# Patient Record
Sex: FEMALE | Race: WHITE | NOT HISPANIC OR LATINO | Employment: PART TIME | URBAN - METROPOLITAN AREA
[De-identification: names, ages, dates, MRNs, and addresses within clinical notes are randomized per-mention and may not be internally consistent; named-entity substitution may affect disease eponyms.]

---

## 2019-03-11 ENCOUNTER — OFFICE VISIT (OUTPATIENT)
Dept: URGENT CARE | Facility: CLINIC | Age: 24
End: 2019-03-11
Payer: COMMERCIAL

## 2019-03-11 VITALS
DIASTOLIC BLOOD PRESSURE: 63 MMHG | RESPIRATION RATE: 20 BRPM | HEIGHT: 68 IN | OXYGEN SATURATION: 99 % | WEIGHT: 214 LBS | BODY MASS INDEX: 32.43 KG/M2 | SYSTOLIC BLOOD PRESSURE: 118 MMHG | HEART RATE: 76 BPM | TEMPERATURE: 98.3 F

## 2019-03-11 DIAGNOSIS — J02.9 ACUTE VIRAL PHARYNGITIS: Primary | ICD-10-CM

## 2019-03-11 LAB — S PYO AG THROAT QL: NEGATIVE

## 2019-03-11 PROCEDURE — 87430 STREP A AG IA: CPT | Performed by: PREVENTIVE MEDICINE

## 2019-03-11 PROCEDURE — 99283 EMERGENCY DEPT VISIT LOW MDM: CPT | Performed by: PREVENTIVE MEDICINE

## 2019-03-11 PROCEDURE — G0382 LEV 3 HOSP TYPE B ED VISIT: HCPCS | Performed by: PREVENTIVE MEDICINE

## 2019-03-11 NOTE — PATIENT INSTRUCTIONS
Hot salt water gargles may be helpful  Cepacol lozenges for pain  Motrin or Tylenol for fever, chills or aches  Follow up with PCP in 3-5 days if no improvement in symptoms done

## 2019-03-11 NOTE — PROGRESS NOTES
[facility]        NAME: Azeb Rodgers is a 26 y o  female  : 1995    MRN: [id number]  DATE: 2019  TIME: 3:25 PM    Assessment and Plan   Acute viral pharyngitis [J02 8, B97 89]  1  Acute viral pharyngitis           Patient Instructions       Follow up with PCP in 3-5 days  Proceed to  ER if symptoms worsen  Chief Complaint     Chief Complaint   Patient presents with    Sore Throat     patient reports she has had a productive cough along with a sore throat x 1 5 weeks  deneis fever or chills  taking Ibuprofen alternating with Tylenol, last dose this am             History of Present Illness       Sore throat and cough  for 1 and half weeks  Denies fever  Denies rash  Review of Systems   Review of Systems   HENT: Positive for congestion and sore throat  Respiratory: Positive for cough  Current Medications     No current outpatient medications on file  Current Allergies     Allergies as of 2019    (No Known Allergies)            The following portions of the patient's history were reviewed and updated as appropriate: allergies, current medications, past family history, past medical history, past social history, past surgical history and problem list      History reviewed  No pertinent past medical history  History reviewed  No pertinent surgical history  No family history on file  Medications have been verified  Objective   /63   Pulse 76   Temp 98 3 °F (36 8 °C)   Resp 20   Ht 5' 8" (1 727 m)   Wt 97 1 kg (214 lb)   LMP 2019   SpO2 99%   BMI 32 54 kg/m²        Physical Exam     Physical Exam   HENT:   Mouth/Throat: Posterior oropharyngeal edema present  TMs blocked by wax   Cardiovascular: Normal heart sounds  Pulmonary/Chest: Breath sounds normal    Lymphadenopathy:     She has no cervical adenopathy      Rapid strep screen negative at 5 minutes

## 2019-03-13 LAB — B-HEM STREP SPEC QL CULT: NEGATIVE

## 2019-03-29 ENCOUNTER — OFFICE VISIT (OUTPATIENT)
Dept: URGENT CARE | Facility: CLINIC | Age: 24
End: 2019-03-29
Payer: COMMERCIAL

## 2019-03-29 VITALS
HEART RATE: 92 BPM | DIASTOLIC BLOOD PRESSURE: 81 MMHG | TEMPERATURE: 99.7 F | RESPIRATION RATE: 16 BRPM | OXYGEN SATURATION: 98 % | WEIGHT: 210 LBS | HEIGHT: 68 IN | BODY MASS INDEX: 31.83 KG/M2 | SYSTOLIC BLOOD PRESSURE: 129 MMHG

## 2019-03-29 DIAGNOSIS — J01.00 ACUTE NON-RECURRENT MAXILLARY SINUSITIS: Primary | ICD-10-CM

## 2019-03-29 PROCEDURE — 99283 EMERGENCY DEPT VISIT LOW MDM: CPT | Performed by: PHYSICIAN ASSISTANT

## 2019-03-29 PROCEDURE — G0382 LEV 3 HOSP TYPE B ED VISIT: HCPCS | Performed by: PHYSICIAN ASSISTANT

## 2019-03-29 RX ORDER — AMOXICILLIN AND CLAVULANATE POTASSIUM 875; 125 MG/1; MG/1
1 TABLET, FILM COATED ORAL EVERY 12 HOURS SCHEDULED
Qty: 20 TABLET | Refills: 0 | Status: SHIPPED | OUTPATIENT
Start: 2019-03-29 | End: 2019-04-08

## 2019-03-29 NOTE — PROGRESS NOTES
NAME: Kimberly Lovelace is a 26 y o  female  : 1995    MRN: [id number]      Assessment and Plan   Acute non-recurrent maxillary sinusitis [J01 00]  1  Acute non-recurrent maxillary sinusitis  amoxicillin-clavulanate (AUGMENTIN) 875-125 mg per tablet       Mark Pollard was seen today for cold like symptoms  Diagnoses and all orders for this visit:    Acute non-recurrent maxillary sinusitis  -     amoxicillin-clavulanate (AUGMENTIN) 875-125 mg per tablet; Take 1 tablet by mouth every 12 (twelve) hours for 10 days        Patient Instructions   Patient Instructions   Exam findings are consistent with bacterial sinusitis  Take Augmentin as noted  Advise Pt to use OTC Flonase for nasal congestion  Advise Pt to use of OTC Tylenol alternating with OTC Ibuprofen for pain or fever  If symptoms persist the next 2-3 days Pt should follow-up with PCP  If >102 fever, N/V, SOB, CP go to ER  Patient understands and agrees with treatment plans  Proceed to ER if symptoms worsen  Chief Complaint     Chief Complaint   Patient presents with    Cold Like Symptoms     sinus congestion for approx 1 week  Pt reports she was seen in our office and since has had onset or new/worsening symptoms  History of Present Illness     26year old presents c/o cough x 1 wk  Pt reports she was 3/11   Pt reports symptoms improvement  Pt admits productive cough, sinus pressure and pain, sinus H/A, ear pressure, rhinorrhea, nasal congestion, PND  Pt denies  fever, chills, fatigue, n/v/d/c, ,  sore throat,  ear pain, SOB, chest pain, difficulty breathing  Has taken OTC  Mucinex with no relief  Review of Systems   Review of Systems   Constitutional: Negative for chills, fatigue and fever  HENT: Positive for ear pain, postnasal drip, rhinorrhea and sinus pressure  Negative for congestion and sore throat  Respiratory: Positive for cough  Negative for chest tightness, shortness of breath and wheezing      Cardiovascular: Negative for chest pain and palpitations  Gastrointestinal: Negative for abdominal pain, constipation, diarrhea, nausea and vomiting  Current Medications       Current Outpatient Medications:     amoxicillin-clavulanate (AUGMENTIN) 875-125 mg per tablet, Take 1 tablet by mouth every 12 (twelve) hours for 10 days, Disp: 20 tablet, Rfl: 0    Current Allergies     Allergies as of 03/29/2019    (No Known Allergies)              No past medical history on file  No past surgical history on file  Family History   Problem Relation Age of Onset    No Known Problems Mother     No Known Problems Father          Medications have been verified  The following portions of the patient's history were reviewed and updated as appropriate: allergies, current medications, past family history, past medical history, past social history, past surgical history and problem list     Objective   /81   Pulse 92   Temp 99 7 °F (37 6 °C)   Resp 16   Ht 5' 8" (1 727 m)   Wt 95 3 kg (210 lb)   LMP  (Within Weeks)   SpO2 98%   BMI 31 93 kg/m²      Physical Exam     Physical Exam   Constitutional: She appears well-developed and well-nourished  No distress  HENT:   Head: Normocephalic and atraumatic  Right Ear: Hearing, tympanic membrane, external ear and ear canal normal    Left Ear: Hearing, tympanic membrane, external ear and ear canal normal    Nose: Right sinus exhibits maxillary sinus tenderness  Right sinus exhibits no frontal sinus tenderness  Left sinus exhibits maxillary sinus tenderness  Left sinus exhibits no frontal sinus tenderness  Mouth/Throat: Uvula is midline, oropharynx is clear and moist and mucous membranes are normal  No tonsillar exudate  Cardiovascular: Normal rate, regular rhythm and normal heart sounds  Exam reveals no gallop and no friction rub  No murmur heard  Pulmonary/Chest: Effort normal and breath sounds normal  No stridor  She has no wheezes  She has no rales     Skin: She is not diaphoretic  Nursing note and vitals reviewed        Svitlana Campbell PA-C

## 2019-03-29 NOTE — PATIENT INSTRUCTIONS
Exam findings are consistent with bacterial sinusitis  Take Augmentin as noted  Advise Pt to use OTC Flonase for nasal congestion  Advise Pt to use of OTC Tylenol alternating with OTC Ibuprofen for pain or fever  If symptoms persist the next 2-3 days Pt should follow-up with PCP  If >102 fever, N/V, SOB, CP go to ER  Patient understands and agrees with treatment plans

## 2021-09-10 ENCOUNTER — APPOINTMENT (OUTPATIENT)
Dept: RADIOLOGY | Facility: CLINIC | Age: 26
End: 2021-09-10
Payer: COMMERCIAL

## 2021-09-10 ENCOUNTER — OFFICE VISIT (OUTPATIENT)
Dept: URGENT CARE | Facility: CLINIC | Age: 26
End: 2021-09-10
Payer: COMMERCIAL

## 2021-09-10 VITALS
WEIGHT: 244 LBS | RESPIRATION RATE: 16 BRPM | HEART RATE: 74 BPM | BODY MASS INDEX: 36.98 KG/M2 | HEIGHT: 68 IN | TEMPERATURE: 97.6 F | OXYGEN SATURATION: 100 %

## 2021-09-10 DIAGNOSIS — M25.511 CHRONIC RIGHT SHOULDER PAIN: Primary | ICD-10-CM

## 2021-09-10 DIAGNOSIS — G89.29 CHRONIC RIGHT SHOULDER PAIN: Primary | ICD-10-CM

## 2021-09-10 DIAGNOSIS — M25.531 RIGHT WRIST PAIN: ICD-10-CM

## 2021-09-10 DIAGNOSIS — M25.511 RIGHT SHOULDER PAIN, UNSPECIFIED CHRONICITY: ICD-10-CM

## 2021-09-10 PROCEDURE — G0382 LEV 3 HOSP TYPE B ED VISIT: HCPCS | Performed by: PHYSICIAN ASSISTANT

## 2021-09-10 PROCEDURE — 73030 X-RAY EXAM OF SHOULDER: CPT

## 2021-09-10 PROCEDURE — 99283 EMERGENCY DEPT VISIT LOW MDM: CPT | Performed by: PHYSICIAN ASSISTANT

## 2021-09-10 NOTE — LETTER
September 10, 2021     Patient: Hannah Hung   YOB: 1995   Date of Visit: 9/10/2021       To Whom it May Concern:    Hannah Hung was seen in my clinic on 9/10/2021  She may return to work on 9/11/2021  If you have any questions or concerns, please don't hesitate to call           Sincerely,          Mani Ashley PA-C        CC: Hannah Hung

## 2021-09-10 NOTE — PROGRESS NOTES
[facility]        NAME: Kamlesh Damon is a 27 y o  female  : 1995    MRN: [id number]  DATE: September 10, 2021  TIME: 1:33 PM    Assessment and Plan   Chronic right shoulder pain [M25 511, G89 29]  1  Chronic right shoulder pain  XR shoulder 2+ vw right    Ambulatory referral to Orthopedic Surgery   2  Right wrist pain  Ambulatory referral to Orthopedic Surgery         Patient Instructions      discussed condition with patient  X-ray of the right shoulder is normal   She has chronic right shoulder pain of uncertain etiology but she is right-hand dominant and performs a lot of repetitive activity so maybe tendinitis due to overuse  She also has right wrist pain which I believe may be due to compensating for right shoulder issues thus causing inflammation in the wrist and forearm  She was given a wrist brace and I recommended ice, rest, stretching, over-the-counter NSAIDs, and she was referred to Orthopedics for consult if issues persist   Follow up with PCP in 3-5 days  Proceed to  ER if symptoms worsen  Chief Complaint     Chief Complaint   Patient presents with    Arm Pain     right wrist (a few months ago) and right shoulder pain (began in )  pt denies single injury but 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         History of Present Illness        Patient presents what she reports is chronic intermittent shoulder pain which she has experienced for the past few years  She denies any known injury or trauma  She is right-hand dominant  She performs a lot of repetitive motion activity and lifting  She also reports that more recently she has now developed pain in the right wrist and distal forearm  She is concerned because she was previously treated for Lyme disease with antibiotics and is wondering if this could be a recurrence but denies any recent tick bites or any other significant symptoms at this time  She denies any right upper extremity numbness or paresthesias        Review of Systems   Review of Systems   Constitutional: Negative  Respiratory: Negative  Cardiovascular: Negative  Gastrointestinal: Negative  Genitourinary: Negative  Musculoskeletal:        Chronic right shoulder and right wrist / forearm pain, no known trauma   Neurological: Negative  Current Medications     No current outpatient medications on file  Current Allergies     Allergies as of 09/10/2021    (No Known Allergies)            The following portions of the patient's history were reviewed and updated as appropriate: allergies, current medications, past family history, past medical history, past social history, past surgical history and problem list      History reviewed  No pertinent past medical history  History reviewed  No pertinent surgical history  Family History   Problem Relation Age of Onset    No Known Problems Mother     No Known Problems Father          Medications have been verified  Objective   Pulse 74   Temp 97 6 °F (36 4 °C)   Resp 16   Ht 5' 8" (1 727 m)   Wt 111 kg (244 lb)   SpO2 100%   BMI 37 10 kg/m²   No LMP recorded  Physical Exam     Physical Exam  Vitals reviewed  Constitutional:       General: She is not in acute distress  Appearance: She is well-developed  Musculoskeletal:      Comments: Tenderness to palpation over the right shoulder joint worsened with passive range of motion which is overall intact without significant difficulty  There is no crepitus or sign of instability noted  There is no swelling, ecchymosis, or deformity  Tenderness to palpation right wrist joint but no swelling, bruising, or deformity  Pain is worse with passive range of motion which is overall intact   strength 5/5 upper extremities bilaterally but push/ pull 4/5 right upper extremity compared to 05/05 left upper extremity  Neurological:      Mental Status: She is alert and oriented to person, place, and time        Sensory: No sensory deficit

## 2021-11-10 ENCOUNTER — APPOINTMENT (OUTPATIENT)
Dept: RADIOLOGY | Facility: CLINIC | Age: 26
End: 2021-11-10
Payer: COMMERCIAL

## 2021-11-10 ENCOUNTER — OFFICE VISIT (OUTPATIENT)
Dept: URGENT CARE | Facility: CLINIC | Age: 26
End: 2021-11-10
Payer: COMMERCIAL

## 2021-11-10 VITALS
OXYGEN SATURATION: 100 % | DIASTOLIC BLOOD PRESSURE: 72 MMHG | WEIGHT: 253 LBS | HEART RATE: 75 BPM | SYSTOLIC BLOOD PRESSURE: 118 MMHG | TEMPERATURE: 99.5 F | RESPIRATION RATE: 16 BRPM | BODY MASS INDEX: 38.34 KG/M2 | HEIGHT: 68 IN

## 2021-11-10 DIAGNOSIS — L03.011 PARONYCHIA OF FINGER OF RIGHT HAND: Primary | ICD-10-CM

## 2021-11-10 DIAGNOSIS — M79.644 PAIN OF FINGER OF RIGHT HAND: ICD-10-CM

## 2021-11-10 PROCEDURE — G0382 LEV 3 HOSP TYPE B ED VISIT: HCPCS | Performed by: PHYSICIAN ASSISTANT

## 2021-11-10 PROCEDURE — 99283 EMERGENCY DEPT VISIT LOW MDM: CPT | Performed by: PHYSICIAN ASSISTANT

## 2021-11-10 PROCEDURE — 73140 X-RAY EXAM OF FINGER(S): CPT

## 2021-11-10 RX ORDER — CEPHALEXIN 500 MG/1
500 CAPSULE ORAL EVERY 8 HOURS SCHEDULED
Qty: 21 CAPSULE | Refills: 0 | Status: SHIPPED | OUTPATIENT
Start: 2021-11-10 | End: 2021-11-17

## 2022-02-08 ENCOUNTER — OFFICE VISIT (OUTPATIENT)
Dept: FAMILY MEDICINE CLINIC | Facility: CLINIC | Age: 27
End: 2022-02-08
Payer: COMMERCIAL

## 2022-02-08 VITALS
WEIGHT: 256.8 LBS | OXYGEN SATURATION: 100 % | HEIGHT: 68 IN | SYSTOLIC BLOOD PRESSURE: 106 MMHG | BODY MASS INDEX: 38.92 KG/M2 | DIASTOLIC BLOOD PRESSURE: 78 MMHG | TEMPERATURE: 98.5 F | HEART RATE: 90 BPM | RESPIRATION RATE: 18 BRPM

## 2022-02-08 DIAGNOSIS — G44.221 CHRONIC TENSION-TYPE HEADACHE, INTRACTABLE: Primary | ICD-10-CM

## 2022-02-08 DIAGNOSIS — G43.119 INTRACTABLE MIGRAINE WITH AURA WITHOUT STATUS MIGRAINOSUS: ICD-10-CM

## 2022-02-08 DIAGNOSIS — Z12.4 CERVICAL CANCER SCREENING: ICD-10-CM

## 2022-02-08 DIAGNOSIS — Z13.220 ENCOUNTER FOR SCREENING FOR LIPID DISORDER: ICD-10-CM

## 2022-02-08 DIAGNOSIS — Z13.1 SCREENING FOR DIABETES MELLITUS: ICD-10-CM

## 2022-02-08 PROCEDURE — 99204 OFFICE O/P NEW MOD 45 MIN: CPT | Performed by: FAMILY MEDICINE

## 2022-02-08 PROCEDURE — 3725F SCREEN DEPRESSION PERFORMED: CPT | Performed by: FAMILY MEDICINE

## 2022-02-08 PROCEDURE — 3008F BODY MASS INDEX DOCD: CPT | Performed by: FAMILY MEDICINE

## 2022-02-08 PROCEDURE — 1036F TOBACCO NON-USER: CPT | Performed by: FAMILY MEDICINE

## 2022-02-08 RX ORDER — METHYLPREDNISOLONE 4 MG/1
TABLET ORAL
Qty: 21 TABLET | Refills: 0 | Status: SHIPPED | OUTPATIENT
Start: 2022-02-08

## 2022-02-08 NOTE — ASSESSMENT & PLAN NOTE
BMI Counseling: Body mass index is 39 39 kg/m²  The BMI is above normal  Nutrition recommendations include reducing portion sizes, decreasing overall calorie intake and 3-5 servings of fruits/vegetables daily  Exercise recommendations include vigorous aerobic physical activity for 75 minutes/week

## 2022-02-08 NOTE — PROGRESS NOTES
Gracia Kelley 1995 female MRN: [id number]    [facility] Medicine New Patient  ASSESSMENT/PLAN  Problem List Items Addressed This Visit        Cardiovascular and Mediastinum    Intractable migraine with aura without status migrainosus    Relevant Medications    methylPREDNISolone 4 MG tablet therapy pack    Other Relevant Orders    Lipid panel    Comprehensive metabolic panel    CBC and differential    TSH, 3rd generation with Free T4 reflex    UA (URINE) with reflex to Scope    Magnesium    Lyme Antibody Profile with reflex to WB       Nervous and Auditory    Chronic tension-type headache, intractable - Primary    Relevant Orders    Comprehensive metabolic panel    CBC and differential    TSH, 3rd generation with Free T4 reflex    UA (URINE) with reflex to Scope    Magnesium    Lyme Antibody Profile with reflex to WB       Other    BMI 39 0-39 9,adult     BMI Counseling: Body mass index is 39 39 kg/m²  The BMI is above normal  Nutrition recommendations include reducing portion sizes, decreasing overall calorie intake and 3-5 servings of fruits/vegetables daily  Exercise recommendations include vigorous aerobic physical activity for 75 minutes/week  Relevant Orders    Lipid panel    Comprehensive metabolic panel    CBC and differential    TSH, 3rd generation with Free T4 reflex    UA (URINE) with reflex to Scope    Magnesium    Lyme Antibody Profile with reflex to WB      Other Visit Diagnoses     Cervical cancer screening        Relevant Orders    Ambulatory Referral to Obstetrics / Gynecology    Screening for diabetes mellitus        Relevant Orders    Lipid panel    Comprehensive metabolic panel    Encounter for screening for lipid disorder        Relevant Orders    Lipid panel    Comprehensive metabolic panel          New Patient  Get labs, try steroid pack  Follow up for CP in 4 weeks  No future appointments         SUBJECTIVE  CC: New patient (establish care) and Headache (Pt reports constant headaches, pt reports she only has a day and a half pain free  Headaches worsen with menstrual cycle  OTC medications are not working  )      HPI:  Thania Alonso is a 27 y o  female who presents for establish care  PMH: denies  PSH: denies    Headache- 6 years of dealing with them just started  Reports pain is variable, every day feels different as far as location  Reports sensitive to light and sound  Sometimes has a twitch in her eye which tells her it is coming on  Reports symptoms are now more frequent  Reports daily headache now ans especially worse during her menstrual cycle  Reports no medical work up for this  Takes OTC ibuprofen/Tylenol/excedrin  HPI    Review of Systems   Constitutional: Negative for chills, fatigue and fever  HENT: Negative for congestion, postnasal drip, rhinorrhea and sinus pressure  Eyes: Negative for photophobia and visual disturbance  Respiratory: Negative for cough and shortness of breath  Cardiovascular: Negative for chest pain, palpitations and leg swelling  Gastrointestinal: Negative for abdominal pain, constipation, diarrhea, nausea and vomiting  Genitourinary: Negative for difficulty urinating and dysuria  Musculoskeletal: Negative for arthralgias and myalgias  Skin: Negative for color change and rash  Neurological: Positive for headaches  Negative for dizziness, weakness and light-headedness  Historical Information   The patient history was reviewed as follows:    History reviewed  No pertinent past medical history  History reviewed  No pertinent surgical history    Family History   Problem Relation Age of Onset    No Known Problems Mother     No Known Problems Father       Social History   Social History     Substance and Sexual Activity   Alcohol Use Never     Social History     Substance and Sexual Activity   Drug Use Never     Social History     Tobacco Use   Smoking Status Never Smoker   Smokeless Tobacco Never Used Medications:     Current Outpatient Medications:     methylPREDNISolone 4 MG tablet therapy pack, Use as directed on package, Disp: 21 tablet, Rfl: 0  No Known Allergies    OBJECTIVE    Vitals:   Vitals:    02/08/22 1357   BP: 106/78   BP Location: Left arm   Patient Position: Sitting   Cuff Size: Large   Pulse: 90   Resp: 18   Temp: 98 5 °F (36 9 °C)   TempSrc: Tympanic   SpO2: 100%   Weight: 116 kg (256 lb 12 8 oz)   Height: 5' 7 7" (1 72 m)           Physical Exam  Constitutional:       Appearance: She is well-developed  HENT:      Head: Normocephalic and atraumatic  Right Ear: Tympanic membrane and external ear normal  There is no impacted cerumen  Left Ear: Tympanic membrane and external ear normal  There is no impacted cerumen  Eyes:      Extraocular Movements: Extraocular movements intact  Conjunctiva/sclera: Conjunctivae normal       Pupils: Pupils are equal, round, and reactive to light  Cardiovascular:      Rate and Rhythm: Normal rate and regular rhythm  Heart sounds: Normal heart sounds  Pulmonary:      Effort: Pulmonary effort is normal  No respiratory distress  Breath sounds: Normal breath sounds  No wheezing  Musculoskeletal:         General: No tenderness  Normal range of motion  Cervical back: Normal range of motion and neck supple  Skin:     General: Skin is warm and dry  Neurological:      Mental Status: She is alert and oriented to person, place, and time     Psychiatric:         Mood and Affect: Mood normal          Behavior: Behavior normal             Labs:        Elizabeth Willis DO    2/8/2022

## 2022-02-11 DIAGNOSIS — A69.20 ACUTE LYME DISEASE: Primary | ICD-10-CM

## 2022-02-11 LAB
ALBUMIN SERPL-MCNC: 4.2 G/DL (ref 3.9–5)
ALBUMIN/GLOB SERPL: 1.4 {RATIO} (ref 1.2–2.2)
ALP SERPL-CCNC: 76 IU/L (ref 44–121)
ALT SERPL-CCNC: 9 IU/L (ref 0–32)
APPEARANCE UR: CLEAR
AST SERPL-CCNC: 16 IU/L (ref 0–40)
B BURGDOR IGG PATRN SER IB-IMP: POSITIVE
B BURGDOR IGG+IGM SER-ACNC: 2.66 ISR (ref 0–0.9)
B BURGDOR IGM PATRN SER IB-IMP: POSITIVE
B BURGDOR18KD IGG SER QL IB: PRESENT
B BURGDOR23KD IGG SER QL IB: PRESENT
B BURGDOR23KD IGM SER QL IB: PRESENT
B BURGDOR28KD IGG SER QL IB: PRESENT
B BURGDOR30KD IGG SER QL IB: PRESENT
B BURGDOR39KD IGG SER QL IB: PRESENT
B BURGDOR39KD IGM SER QL IB: ABNORMAL
B BURGDOR41KD IGG SER QL IB: PRESENT
B BURGDOR41KD IGM SER QL IB: PRESENT
B BURGDOR45KD IGG SER QL IB: PRESENT
B BURGDOR58KD IGG SER QL IB: PRESENT
B BURGDOR66KD IGG SER QL IB: PRESENT
B BURGDOR93KD IGG SER QL IB: PRESENT
BASOPHILS # BLD AUTO: 0 X10E3/UL (ref 0–0.2)
BASOPHILS NFR BLD AUTO: 1 %
BILIRUB SERPL-MCNC: 0.5 MG/DL (ref 0–1.2)
BILIRUB UR QL STRIP: NEGATIVE
BUN SERPL-MCNC: 13 MG/DL (ref 6–20)
BUN/CREAT SERPL: 16 (ref 9–23)
CALCIUM SERPL-MCNC: 9.2 MG/DL (ref 8.7–10.2)
CHLORIDE SERPL-SCNC: 105 MMOL/L (ref 96–106)
CHOLEST SERPL-MCNC: 152 MG/DL (ref 100–199)
CHOLEST/HDLC SERPL: 3.3 RATIO (ref 0–4.4)
CO2 SERPL-SCNC: 22 MMOL/L (ref 20–29)
COLOR UR: YELLOW
CREAT SERPL-MCNC: 0.81 MG/DL (ref 0.57–1)
EOSINOPHIL # BLD AUTO: 0.1 X10E3/UL (ref 0–0.4)
EOSINOPHIL NFR BLD AUTO: 1 %
ERYTHROCYTE [DISTWIDTH] IN BLOOD BY AUTOMATED COUNT: 16.9 % (ref 11.7–15.4)
GLOBULIN SER-MCNC: 2.9 G/DL (ref 1.5–4.5)
GLUCOSE SERPL-MCNC: 97 MG/DL (ref 65–99)
GLUCOSE UR QL: NEGATIVE
HCT VFR BLD AUTO: 35.8 % (ref 34–46.6)
HDLC SERPL-MCNC: 46 MG/DL
HGB BLD-MCNC: 11.1 G/DL (ref 11.1–15.9)
HGB UR QL STRIP: NEGATIVE
IMM GRANULOCYTES # BLD: 0 X10E3/UL (ref 0–0.1)
IMM GRANULOCYTES NFR BLD: 0 %
KETONES UR QL STRIP: NEGATIVE
LDLC SERPL CALC-MCNC: 95 MG/DL (ref 0–99)
LEUKOCYTE ESTERASE UR QL STRIP: NEGATIVE
LYMPHOCYTES # BLD AUTO: 2.2 X10E3/UL (ref 0.7–3.1)
LYMPHOCYTES NFR BLD AUTO: 34 %
MAGNESIUM SERPL-MCNC: 2.1 MG/DL (ref 1.6–2.3)
MCH RBC QN AUTO: 25.2 PG (ref 26.6–33)
MCHC RBC AUTO-ENTMCNC: 31 G/DL (ref 31.5–35.7)
MCV RBC AUTO: 81 FL (ref 79–97)
MICRO URNS: NORMAL
MONOCYTES # BLD AUTO: 0.5 X10E3/UL (ref 0.1–0.9)
MONOCYTES NFR BLD AUTO: 7 %
NEUTROPHILS # BLD AUTO: 3.7 X10E3/UL (ref 1.4–7)
NEUTROPHILS NFR BLD AUTO: 57 %
NITRITE UR QL STRIP: NEGATIVE
PH UR STRIP: 5.5 [PH] (ref 5–7.5)
PLATELET # BLD AUTO: 387 X10E3/UL (ref 150–450)
POTASSIUM SERPL-SCNC: 4 MMOL/L (ref 3.5–5.2)
PROT SERPL-MCNC: 7.1 G/DL (ref 6–8.5)
PROT UR QL STRIP: NEGATIVE
RBC # BLD AUTO: 4.41 X10E6/UL (ref 3.77–5.28)
SL AMB EGFR AFRICAN AMERICAN: 118 ML/MIN/1.73
SL AMB EGFR NON AFRICAN AMERICAN: 102 ML/MIN/1.73
SL AMB VLDL CHOLESTEROL CALC: 11 MG/DL (ref 5–40)
SODIUM SERPL-SCNC: 142 MMOL/L (ref 134–144)
SP GR UR: 1.03 (ref 1–1.03)
TRIGL SERPL-MCNC: 51 MG/DL (ref 0–149)
TSH SERPL DL<=0.005 MIU/L-ACNC: 3.94 UIU/ML (ref 0.45–4.5)
UROBILINOGEN UR STRIP-ACNC: 0.2 MG/DL (ref 0.2–1)
WBC # BLD AUTO: 6.6 X10E3/UL (ref 3.4–10.8)

## 2022-02-11 RX ORDER — DOXYCYCLINE HYCLATE 100 MG/1
100 CAPSULE ORAL EVERY 12 HOURS SCHEDULED
Qty: 42 CAPSULE | Refills: 0 | Status: SHIPPED | OUTPATIENT
Start: 2022-02-11 | End: 2022-03-04

## 2022-03-11 ENCOUNTER — OFFICE VISIT (OUTPATIENT)
Dept: FAMILY MEDICINE CLINIC | Facility: CLINIC | Age: 27
End: 2022-03-11
Payer: COMMERCIAL

## 2022-03-11 VITALS
HEIGHT: 68 IN | DIASTOLIC BLOOD PRESSURE: 68 MMHG | TEMPERATURE: 98.9 F | OXYGEN SATURATION: 99 % | RESPIRATION RATE: 16 BRPM | HEART RATE: 89 BPM | BODY MASS INDEX: 39.65 KG/M2 | SYSTOLIC BLOOD PRESSURE: 116 MMHG | WEIGHT: 261.6 LBS

## 2022-03-11 DIAGNOSIS — Z86.19 HISTORY OF LYME DISEASE: ICD-10-CM

## 2022-03-11 DIAGNOSIS — G44.221 CHRONIC TENSION-TYPE HEADACHE, INTRACTABLE: ICD-10-CM

## 2022-03-11 DIAGNOSIS — Z00.00 ANNUAL PHYSICAL EXAM: Primary | ICD-10-CM

## 2022-03-11 DIAGNOSIS — Z23 ENCOUNTER FOR IMMUNIZATION: ICD-10-CM

## 2022-03-11 DIAGNOSIS — G43.119 INTRACTABLE MIGRAINE WITH AURA WITHOUT STATUS MIGRAINOSUS: ICD-10-CM

## 2022-03-11 DIAGNOSIS — Z12.4 CERVICAL CANCER SCREENING: ICD-10-CM

## 2022-03-11 DIAGNOSIS — Z13.1 SCREENING FOR DIABETES MELLITUS: ICD-10-CM

## 2022-03-11 PROCEDURE — 90471 IMMUNIZATION ADMIN: CPT | Performed by: FAMILY MEDICINE

## 2022-03-11 PROCEDURE — 1036F TOBACCO NON-USER: CPT | Performed by: FAMILY MEDICINE

## 2022-03-11 PROCEDURE — 99395 PREV VISIT EST AGE 18-39: CPT | Performed by: FAMILY MEDICINE

## 2022-03-11 PROCEDURE — 90715 TDAP VACCINE 7 YRS/> IM: CPT | Performed by: FAMILY MEDICINE

## 2022-03-11 PROCEDURE — 3725F SCREEN DEPRESSION PERFORMED: CPT | Performed by: FAMILY MEDICINE

## 2022-03-11 PROCEDURE — 3008F BODY MASS INDEX DOCD: CPT | Performed by: FAMILY MEDICINE

## 2022-03-11 RX ORDER — TOPIRAMATE 25 MG/1
25 TABLET ORAL DAILY
Qty: 90 TABLET | Refills: 0 | Status: SHIPPED | OUTPATIENT
Start: 2022-03-11 | End: 2022-06-07 | Stop reason: SDUPTHER

## 2022-03-11 NOTE — PATIENT INSTRUCTIONS

## 2022-03-11 NOTE — PROGRESS NOTES
[facility]    NAME: Gracia Kelley  AGE: 27 y o  SEX: female  : 1995     DATE: 3/11/2022     Assessment and Plan:     Problem List Items Addressed This Visit        Cardiovascular and Mediastinum    Intractable migraine with aura without status migrainosus    Relevant Medications    topiramate (Topamax) 25 mg tablet    Other Relevant Orders    Ambulatory Referral to Neurology       Nervous and Auditory    Chronic tension-type headache, intractable    Relevant Medications    topiramate (Topamax) 25 mg tablet    Other Relevant Orders    Ambulatory Referral to Neurology       Other    BMI 39 0-39 9,adult    Relevant Orders    Insulin and C-Peptide, Serum    Hemoglobin A1C    History of Lyme disease      Other Visit Diagnoses     Annual physical exam    -  Primary    Encounter for immunization        Relevant Orders    TDAP VACCINE GREATER THAN OR EQUAL TO 6YO IM (Completed)    Cervical cancer screening        Relevant Orders    Ambulatory Referral to Obstetrics / Gynecology    Screening for diabetes mellitus        Relevant Orders    Insulin and C-Peptide, Serum    Hemoglobin A1C          Immunizations and preventive care screenings were discussed with patient today  Appropriate education was printed on patient's after visit summary  Counseling:  Alcohol/drug use: discussed moderation in alcohol intake, the recommendations for healthy alcohol use, and avoidance of illicit drug use  Dental Health: discussed importance of regular tooth brushing, flossing, and dental visits  Injury prevention: discussed safety/seat belts, safety helmets, smoke detectors, carbon dioxide detectors, and smoking near bedding or upholstery  Sexual health: discussed sexually transmitted diseases, partner selection, use of condoms, avoidance of unintended pregnancy, and contraceptive alternatives    · Exercise: the importance of regular exercise/physical activity was discussed  Recommend exercise 3-5 times per week for at least 30 minutes  Return in about 4 weeks (around 4/8/2022) for migraine follow up   Chief Complaint:     Chief Complaint   Patient presents with    Physical Exam     no concerns, paperwork for learner's permit      History of Present Illness:     Adult Annual Physical   Patient here for a comprehensive physical exam     Still having issues with headache and migraines  The steroid pack did not help  Tylenol motrin and excedrin are not working  Diet and Physical Activity  · Diet/Nutrition: well balanced diet  · Exercise: moderate cardiovascular exercise  Depression Screening  PHQ-2/9 Depression Screening    Little interest or pleasure in doing things: 0 - not at all  Feeling down, depressed, or hopeless: 0 - not at all  PHQ-2 Score: 0  PHQ-2 Interpretation: Negative depression screen          /GYN Health  · Has apt with gyn at the end of the month      Review of Systems:     Review of Systems   Constitutional: Negative for chills, fatigue and fever  HENT: Negative for congestion, postnasal drip, rhinorrhea and sinus pressure  Eyes: Negative for photophobia and visual disturbance  Respiratory: Negative for cough and shortness of breath  Cardiovascular: Negative for chest pain, palpitations and leg swelling  Gastrointestinal: Negative for abdominal pain, constipation, diarrhea, nausea and vomiting  Genitourinary: Negative for difficulty urinating and dysuria  Musculoskeletal: Negative for arthralgias and myalgias  Skin: Negative for color change and rash  Neurological: Positive for headaches  Negative for dizziness, weakness and light-headedness  Past Medical History:     History reviewed  No pertinent past medical history  Past Surgical History:     History reviewed  No pertinent surgical history     Social History:     Social History     Socioeconomic History    Marital status: Single     Spouse name: None    Number of children: None    Years of education: None    Highest education level: None   Occupational History    None   Tobacco Use    Smoking status: Never Smoker    Smokeless tobacco: Never Used   Vaping Use    Vaping Use: Never used   Substance and Sexual Activity    Alcohol use: Never    Drug use: Never    Sexual activity: None   Other Topics Concern    None   Social History Narrative    None     Social Determinants of Health     Financial Resource Strain: Not on file   Food Insecurity: Not on file   Transportation Needs: Not on file   Physical Activity: Not on file   Stress: Not on file   Social Connections: Not on file   Intimate Partner Violence: Not At Risk    Fear of Current or Ex-Partner: No    Emotionally Abused: No    Physically Abused: No    Sexually Abused: No   Housing Stability: Not on file      Family History:     Family History   Problem Relation Age of Onset    No Known Problems Mother     No Known Problems Father       Current Medications:     Current Outpatient Medications   Medication Sig Dispense Refill    methylPREDNISolone 4 MG tablet therapy pack Use as directed on package (Patient not taking: Reported on 3/11/2022 ) 21 tablet 0    topiramate (Topamax) 25 mg tablet Take 1 tablet (25 mg total) by mouth daily 90 tablet 0     No current facility-administered medications for this visit  Allergies:     No Known Allergies   Physical Exam:     /68 (BP Location: Right arm, Patient Position: Sitting, Cuff Size: Standard)   Pulse 89   Temp 98 9 °F (37 2 °C) (Tympanic)   Resp 16   Ht 5' 7 8" (1 722 m)   Wt 119 kg (261 lb 9 6 oz)   LMP 03/04/2022 (Exact Date)   SpO2 99%   Breastfeeding No   BMI 40 01 kg/m²     Physical Exam  Constitutional:       General: She is not in acute distress  Appearance: Normal appearance  She is not ill-appearing, toxic-appearing or diaphoretic  HENT:      Head: Normocephalic and atraumatic  Right Ear: Tympanic membrane and ear canal normal       Left Ear: Tympanic membrane and ear canal normal       Nose: Nose normal  No congestion  Mouth/Throat:      Mouth: Mucous membranes are moist       Pharynx: Oropharynx is clear  No oropharyngeal exudate  Eyes:      Extraocular Movements: Extraocular movements intact  Conjunctiva/sclera: Conjunctivae normal       Pupils: Pupils are equal, round, and reactive to light  Cardiovascular:      Rate and Rhythm: Normal rate and regular rhythm  Pulses: Normal pulses  Heart sounds: No murmur heard  Pulmonary:      Effort: Pulmonary effort is normal       Breath sounds: Normal breath sounds  No wheezing, rhonchi or rales  Abdominal:      General: Bowel sounds are normal  There is no distension  Palpations: Abdomen is soft  Tenderness: There is no abdominal tenderness  Musculoskeletal:         General: No swelling or tenderness  Normal range of motion  Cervical back: Normal range of motion and neck supple  Skin:     General: Skin is warm and dry  Capillary Refill: Capillary refill takes less than 2 seconds  Neurological:      General: No focal deficit present  Mental Status: She is alert and oriented to person, place, and time  Cranial Nerves: No cranial nerve deficit  Psychiatric:         Mood and Affect: Mood normal          Behavior: Behavior normal          Thought Content:  Thought content normal           [place], DO   [address]

## 2022-03-17 ENCOUNTER — TELEPHONE (OUTPATIENT)
Dept: NEUROLOGY | Facility: CLINIC | Age: 27
End: 2022-03-17

## 2022-03-24 LAB
C PEPTIDE SERPL-MCNC: 3 NG/ML (ref 1.1–4.4)
EST. AVERAGE GLUCOSE BLD GHB EST-MCNC: 108 MG/DL
HBA1C MFR BLD: 5.4 % (ref 4.8–5.6)
INSULIN SERPL-ACNC: 14.4 UIU/ML (ref 2.6–24.9)

## 2022-04-21 ENCOUNTER — OFFICE VISIT (OUTPATIENT)
Dept: OBGYN CLINIC | Facility: CLINIC | Age: 27
End: 2022-04-21
Payer: COMMERCIAL

## 2022-04-21 VITALS
WEIGHT: 265.6 LBS | DIASTOLIC BLOOD PRESSURE: 80 MMHG | SYSTOLIC BLOOD PRESSURE: 126 MMHG | HEIGHT: 67 IN | BODY MASS INDEX: 41.69 KG/M2

## 2022-04-21 DIAGNOSIS — N92.0 MENORRHAGIA WITH REGULAR CYCLE: ICD-10-CM

## 2022-04-21 DIAGNOSIS — Z01.419 WOMEN'S ANNUAL ROUTINE GYNECOLOGICAL EXAMINATION: ICD-10-CM

## 2022-04-21 DIAGNOSIS — N94.6 DYSMENORRHEA: ICD-10-CM

## 2022-04-21 DIAGNOSIS — Z86.69 HISTORY OF MIGRAINE HEADACHES: ICD-10-CM

## 2022-04-21 PROCEDURE — 1036F TOBACCO NON-USER: CPT | Performed by: OBSTETRICS & GYNECOLOGY

## 2022-04-21 PROCEDURE — 99385 PREV VISIT NEW AGE 18-39: CPT | Performed by: OBSTETRICS & GYNECOLOGY

## 2022-04-21 PROCEDURE — 3008F BODY MASS INDEX DOCD: CPT | Performed by: OBSTETRICS & GYNECOLOGY

## 2022-04-21 NOTE — PROGRESS NOTES
Assessment    Annual well-woman exam   Dysmenorrhea   History of migraine headaches   Requesting STD testing        Plan    Screening laboratory studies ordered   Pelvic ultrasound ordered   All questions answered  Await pap smear results  Tigist Smith is a 27 y o  female who presents for annual exam  Periods are regular every 28-30 days, lasting 6 days  Dysmenorrhea:moderate, occurring throughout menses  Cyclic symptoms include pelvic pain  No intermenstrual bleeding, spotting, or discharge  Patient is not currently sexually active, states she has never had intercourse  Concern her  Seem very heavy and painful STD blood clots times patient bleeding past protection  Declines OCPs for management of dysmenorrhea and menorrhagia  Recommend NSAIDs pending laboratory testing and ultrasound The patient reports that there is not domestic violence in her life  Current contraception: none  History of abnormal Pap smear: no  Family history of uterine or ovarian cancer: no  Regular self breast exam: no  History of abnormal mammogram: no  Family history of breast cancer: no  History of abnormal lipids: no  Menstrual History:  OB History        0    Para   0    Term   0       0    AB   0    Living   0       SAB   0    IAB   0    Ectopic   0    Multiple   0    Live Births   0                Menarche age: 17  Patient's last menstrual period was 2022  Review of Systems  Pertinent items are noted in HPI        Objective no acute distress   /80   Ht 5' 7" (1 702 m)   Wt 120 kg (265 lb 9 6 oz)   LMP 2022   BMI 41 60 kg/m²     General:   alert and oriented, in no acute distress, alert, appears stated age and cooperative   Heart: regular rate and rhythm, S1, S2 normal, no murmur, click, rub or gallop   Lungs: clear to auscultation bilaterally   Abdomen: soft, non-tender, without masses or organomegaly   Vulva: normal, Bartholin's, Urethra, Penalosa's normal, female escutcheon   Vagina: normal mucosa, normal discharge, no palpable nodules   Cervix: anteverted, no bleeding following Pap, no cervical motion tenderness, no lesions and nulliparous appearance   Uterus: anteverted, mobile, non-tender, normal shape and consistency   Adnexa: normal adnexa and no mass, fullness, tenderness   Bilateral breast exam in the sitting and supine position with chaperone present, no visible or palpable breast lesions identified  No breast masses noted  No supraclavicular or axillary lymphadenopathy noted  No nipple discharge  Reviewed self-breast exam techniques     Rectal exam,  deferred

## 2022-04-27 LAB
C TRACH RRNA CVX QL NAA+PROBE: NEGATIVE
CYTOLOGIST CVX/VAG CYTO: NORMAL
DX ICD CODE: NORMAL
Lab: NORMAL
N GONORRHOEA RRNA CVX QL NAA+PROBE: NEGATIVE
OTHER STN SPEC: NORMAL
OTHER STN SPEC: NORMAL
PATH REPORT.FINAL DX SPEC: NORMAL
SL AMB NOTE:: NORMAL
SL AMB SPECIMEN ADEQUACY: NORMAL
SL AMB TEST METHODOLOGY: NORMAL

## 2022-05-04 LAB
ERYTHROCYTE [DISTWIDTH] IN BLOOD BY AUTOMATED COUNT: 14.6 % (ref 11.7–15.4)
HCG INTACT+B SERPL-ACNC: <1 MIU/ML
HCT VFR BLD AUTO: 36.3 % (ref 34–46.6)
HGB BLD-MCNC: 11.7 G/DL (ref 11.1–15.9)
MCH RBC QN AUTO: 26.2 PG (ref 26.6–33)
MCHC RBC AUTO-ENTMCNC: 32.2 G/DL (ref 31.5–35.7)
MCV RBC AUTO: 81 FL (ref 79–97)
PLATELET # BLD AUTO: 372 X10E3/UL (ref 150–450)
RBC # BLD AUTO: 4.46 X10E6/UL (ref 3.77–5.28)
TSH SERPL DL<=0.005 MIU/L-ACNC: 2.41 UIU/ML (ref 0.45–4.5)
WBC # BLD AUTO: 7.8 X10E3/UL (ref 3.4–10.8)

## 2022-06-07 DIAGNOSIS — G44.221 CHRONIC TENSION-TYPE HEADACHE, INTRACTABLE: ICD-10-CM

## 2022-06-07 DIAGNOSIS — G43.119 INTRACTABLE MIGRAINE WITH AURA WITHOUT STATUS MIGRAINOSUS: ICD-10-CM

## 2022-06-07 RX ORDER — TOPIRAMATE 25 MG/1
25 TABLET ORAL DAILY
Qty: 90 TABLET | Refills: 0 | Status: SHIPPED | OUTPATIENT
Start: 2022-06-07 | End: 2022-09-05

## 2022-08-23 ENCOUNTER — OFFICE VISIT (OUTPATIENT)
Dept: FAMILY MEDICINE CLINIC | Facility: CLINIC | Age: 27
End: 2022-08-23
Payer: COMMERCIAL

## 2022-08-23 VITALS
SYSTOLIC BLOOD PRESSURE: 114 MMHG | HEART RATE: 90 BPM | BODY MASS INDEX: 42.56 KG/M2 | HEIGHT: 67 IN | OXYGEN SATURATION: 96 % | WEIGHT: 271.2 LBS | DIASTOLIC BLOOD PRESSURE: 76 MMHG | RESPIRATION RATE: 15 BRPM | TEMPERATURE: 98.3 F

## 2022-08-23 DIAGNOSIS — G44.221 CHRONIC TENSION-TYPE HEADACHE, INTRACTABLE: ICD-10-CM

## 2022-08-23 DIAGNOSIS — G43.119 INTRACTABLE MIGRAINE WITH AURA WITHOUT STATUS MIGRAINOSUS: Primary | ICD-10-CM

## 2022-08-23 DIAGNOSIS — M25.50 ARTHRALGIA, UNSPECIFIED JOINT: ICD-10-CM

## 2022-08-23 DIAGNOSIS — M25.561 CHRONIC PAIN OF BOTH KNEES: ICD-10-CM

## 2022-08-23 DIAGNOSIS — M25.562 CHRONIC PAIN OF BOTH KNEES: ICD-10-CM

## 2022-08-23 DIAGNOSIS — G89.29 CHRONIC PAIN OF BOTH KNEES: ICD-10-CM

## 2022-08-23 PROCEDURE — 99214 OFFICE O/P EST MOD 30 MIN: CPT | Performed by: FAMILY MEDICINE

## 2022-08-23 PROCEDURE — 3725F SCREEN DEPRESSION PERFORMED: CPT | Performed by: FAMILY MEDICINE

## 2022-08-23 RX ORDER — TOPIRAMATE 50 MG/1
50 TABLET, FILM COATED ORAL DAILY
Qty: 90 TABLET | Refills: 0 | Status: SHIPPED | OUTPATIENT
Start: 2022-08-23 | End: 2022-11-21

## 2022-08-23 RX ORDER — SUMATRIPTAN 25 MG/1
25 TABLET, FILM COATED ORAL ONCE AS NEEDED
Qty: 9 TABLET | Refills: 3 | Status: SHIPPED | OUTPATIENT
Start: 2022-08-23

## 2022-08-23 NOTE — PROGRESS NOTES
Stefany Powers 1995 female MRN: [id number]    Family Medicine Acute Visit    ASSESSMENT/PLAN  Problem List Items Addressed This Visit        Cardiovascular and Mediastinum    Intractable migraine with aura without status migrainosus - Primary    Relevant Medications    topiramate (Topamax) 50 MG tablet    SUMAtriptan (IMITREX) 25 mg tablet       Nervous and Auditory    Chronic tension-type headache, intractable    Relevant Medications    topiramate (Topamax) 50 MG tablet    SUMAtriptan (IMITREX) 25 mg tablet       Other    Joint pain    Relevant Orders    Uric acid    Lyme Antibody Profile with reflex to WB    RADHA w/Reflex if Positive    Rheumatoid Arthritis Profile    Sedimentation rate, automated    CBC and differential    Ambulatory Referral to Orthopedic Surgery    Chronic pain of both knees    Relevant Orders    Ambulatory Referral to Orthopedic Surgery          Increase topamax for better migraine control with prn imitrex  Check labs and referal to ortho for shoulder/knee pain       No future appointments  SUBJECTIVE  CC: Migraine (Having daily migraines-topamax is not helping )      HPI:  Stefany Powers is a 27 y o  female who presents for acute visit for migraines  Migraines are daily  Taking topamax was working at first but seem like it just isnt working any more  Review of Systems   Constitutional: Negative for chills, fatigue and fever  HENT: Negative for congestion, postnasal drip, rhinorrhea and sinus pressure  Eyes: Negative for photophobia and visual disturbance  Respiratory: Negative for cough and shortness of breath  Cardiovascular: Negative for chest pain, palpitations and leg swelling  Gastrointestinal: Negative for abdominal pain, constipation, diarrhea, nausea and vomiting  Genitourinary: Negative for difficulty urinating and dysuria  Musculoskeletal: Positive for arthralgias  Negative for myalgias  Skin: Negative for color change and rash     Neurological: Positive for headaches  Negative for dizziness, weakness and light-headedness  Historical Information   The patient history was reviewed as follows:  Past Medical History:   Diagnosis Date    Lyme disease     Migraine          History reviewed  No pertinent surgical history  Family History   Problem Relation Age of Onset    Hypertension Mother     No Known Problems Father     Cancer Maternal Grandmother     Heart disease Maternal Grandfather     Diabetes Maternal Grandfather     Heart attack Maternal Grandfather     Heart failure Maternal Grandfather     Cancer Maternal Grandfather     Breast cancer Paternal Grandmother     Hypertension Paternal Grandmother     Hyperlipidemia Paternal Grandmother     Hypertension Paternal Grandfather       Social History   Social History     Substance and Sexual Activity   Alcohol Use Never     Social History     Substance and Sexual Activity   Drug Use Never     Social History     Tobacco Use   Smoking Status Never Smoker   Smokeless Tobacco Never Used       Medications:     Current Outpatient Medications:     SUMAtriptan (IMITREX) 25 mg tablet, Take 1 tablet (25 mg total) by mouth once as needed for migraine for up to 1 dose, Disp: 9 tablet, Rfl: 3    topiramate (Topamax) 50 MG tablet, Take 1 tablet (50 mg total) by mouth daily, Disp: 90 tablet, Rfl: 0    methylPREDNISolone 4 MG tablet therapy pack, Use as directed on package (Patient not taking: No sig reported), Disp: 21 tablet, Rfl: 0    No Known Allergies    OBJECTIVE  Vitals:   Vitals:    08/23/22 1047   BP: 114/76   BP Location: Right arm   Patient Position: Sitting   Cuff Size: Large   Pulse: 90   Resp: 15   Temp: 98 3 °F (36 8 °C)   TempSrc: Tympanic   SpO2: 96%   Weight: 123 kg (271 lb 3 2 oz)   Height: 5' 7" (1 702 m)         Physical Exam  Constitutional:       Appearance: She is well-developed  HENT:      Head: Normocephalic and atraumatic     Eyes:      Extraocular Movements: Extraocular movements intact  Conjunctiva/sclera: Conjunctivae normal    Cardiovascular:      Rate and Rhythm: Normal rate and regular rhythm  Heart sounds: Normal heart sounds  Pulmonary:      Effort: Pulmonary effort is normal  No respiratory distress  Breath sounds: Normal breath sounds  No wheezing  Abdominal:      General: There is no distension  Palpations: Abdomen is soft  Tenderness: There is no abdominal tenderness  Musculoskeletal:         General: Normal range of motion  Right shoulder: Tenderness and crepitus present  Cervical back: Normal range of motion and neck supple  Skin:     General: Skin is warm and dry  Neurological:      Mental Status: She is alert and oriented to person, place, and time     Psychiatric:         Behavior: Behavior normal                     Pj Rollins DO    8/23/2022

## 2022-08-31 LAB
ANA SER QL: NEGATIVE
BASOPHILS # BLD AUTO: 0 X10E3/UL (ref 0–0.2)
BASOPHILS NFR BLD AUTO: 0 %
CCP IGA+IGG SERPL IA-ACNC: 4 UNITS (ref 0–19)
EOSINOPHIL # BLD AUTO: 0.1 X10E3/UL (ref 0–0.4)
EOSINOPHIL NFR BLD AUTO: 1 %
ERYTHROCYTE [DISTWIDTH] IN BLOOD BY AUTOMATED COUNT: 14.8 % (ref 11.7–15.4)
ERYTHROCYTE [SEDIMENTATION RATE] IN BLOOD BY WESTERGREN METHOD: 44 MM/HR (ref 0–32)
HCT VFR BLD AUTO: 37.5 % (ref 34–46.6)
HGB BLD-MCNC: 12 G/DL (ref 11.1–15.9)
IMM GRANULOCYTES # BLD: 0 X10E3/UL (ref 0–0.1)
IMM GRANULOCYTES NFR BLD: 0 %
LYMPHOCYTES # BLD AUTO: 2.7 X10E3/UL (ref 0.7–3.1)
LYMPHOCYTES NFR BLD AUTO: 34 %
MCH RBC QN AUTO: 26 PG (ref 26.6–33)
MCHC RBC AUTO-ENTMCNC: 32 G/DL (ref 31.5–35.7)
MCV RBC AUTO: 81 FL (ref 79–97)
MONOCYTES # BLD AUTO: 0.4 X10E3/UL (ref 0.1–0.9)
MONOCYTES NFR BLD AUTO: 6 %
NEUTROPHILS # BLD AUTO: 4.7 X10E3/UL (ref 1.4–7)
NEUTROPHILS NFR BLD AUTO: 59 %
PLATELET # BLD AUTO: 367 X10E3/UL (ref 150–450)
RBC # BLD AUTO: 4.62 X10E6/UL (ref 3.77–5.28)
RHEUMATOID FACT SERPL-ACNC: <10 IU/ML
URATE SERPL-MCNC: 4.4 MG/DL (ref 2.6–6.2)
WBC # BLD AUTO: 8 X10E3/UL (ref 3.4–10.8)

## 2022-09-01 ENCOUNTER — APPOINTMENT (OUTPATIENT)
Dept: RADIOLOGY | Facility: CLINIC | Age: 27
End: 2022-09-01
Payer: COMMERCIAL

## 2022-09-01 ENCOUNTER — OFFICE VISIT (OUTPATIENT)
Dept: OBGYN CLINIC | Facility: CLINIC | Age: 27
End: 2022-09-01
Payer: COMMERCIAL

## 2022-09-01 VITALS
WEIGHT: 270 LBS | BODY MASS INDEX: 42.38 KG/M2 | SYSTOLIC BLOOD PRESSURE: 112 MMHG | HEIGHT: 67 IN | DIASTOLIC BLOOD PRESSURE: 68 MMHG

## 2022-09-01 DIAGNOSIS — M54.12 CERVICAL RADICULOPATHY: Primary | ICD-10-CM

## 2022-09-01 DIAGNOSIS — M25.511 CHRONIC RIGHT SHOULDER PAIN: ICD-10-CM

## 2022-09-01 DIAGNOSIS — M54.2 CERVICAL MUSCLE PAIN: ICD-10-CM

## 2022-09-01 DIAGNOSIS — M54.2 NECK PAIN: ICD-10-CM

## 2022-09-01 DIAGNOSIS — G89.29 CHRONIC RIGHT SHOULDER PAIN: ICD-10-CM

## 2022-09-01 PROCEDURE — 99203 OFFICE O/P NEW LOW 30 MIN: CPT | Performed by: ORTHOPAEDIC SURGERY

## 2022-09-01 PROCEDURE — 72050 X-RAY EXAM NECK SPINE 4/5VWS: CPT

## 2022-09-01 NOTE — PROGRESS NOTES
Assessment:     1  Cervical radiculopathy    2  Neck pain    3  Chronic right shoulder pain        Plan:     Problem List Items Addressed This Visit        Nervous and Auditory    Cervical radiculopathy - Primary       Other    Joint pain      Other Visit Diagnoses     Neck pain        Relevant Orders    XR spine cervical complete 4 or 5 vw non injury    Ambulatory Referral to Physical Therapy        Findings consistent of loss of cervical lordosis of cervical spine with radiculopathy  Imaging and prognosis reviewed with patient  She has normal shoulder exam  Patient will be referred to physical therapy to rehab cervical spine with modalities, traction, stim  This condition can take at least 6-8 weeks to resolve with therapy  If her symptoms persist instructed she can call and we will place referral to pain management or spine specialist to further evaluate and treat  OTC anti inflammatories, moist heat for pain control  Patient should follow-up with her family physician was regard to the Lyme test   All patient's questions were answered to her satisfaction  This note is created using dictation transcription  It may contain typographical errors, grammatical errors, improperly dictated words, background noise and other errors  Subjective:     Patient ID: Evelio Bailey is a 24 y o  female  Chief Complaint:  24 yr old female RHD in for evaluation of right shoulder pain  She states back in 2018 she worked in [employer] and ran the  which required a lot of repetitive use of right shoulder  She reports no specific injury or trauma  Since then she gets intermittent pain in shoulder and some days can't even use the arm  She will have to hold arm against her chest to alleviate pain  Pain is in upper trap region which goes into right side of neck and down to elbow, she states not below  She denies any current numbness or tingling in arm  She was diagnosed with Lyme in March and has been on Doxyclycline   Patients most recent Lyme test still pending  Information on patient's intake form was reviewed  Allergy:  No Known Allergies  Medications:  all current active meds have been reviewed  Past Medical History:  Past Medical History:   Diagnosis Date    Lyme disease     Migraine      Past Surgical History:  History reviewed  No pertinent surgical history  Family History:  Family History   Problem Relation Age of Onset    Hypertension Mother     No Known Problems Father     Cancer Maternal Grandmother     Heart disease Maternal Grandfather     Diabetes Maternal Grandfather     Heart attack Maternal Grandfather     Heart failure Maternal Grandfather     Cancer Maternal Grandfather     Breast cancer Paternal Grandmother     Hypertension Paternal Grandmother     Hyperlipidemia Paternal Grandmother     Hypertension Paternal Grandfather      Social History:  Social History     Substance and Sexual Activity   Alcohol Use Never     Social History     Substance and Sexual Activity   Drug Use Never     Social History     Tobacco Use   Smoking Status Never Smoker   Smokeless Tobacco Never Used     Review of Systems   Constitutional: Negative for chills and fever  HENT: Negative for ear pain and sore throat  Eyes: Negative for pain and visual disturbance  Respiratory: Negative for cough and shortness of breath  Cardiovascular: Negative for chest pain and palpitations  Gastrointestinal: Negative for abdominal pain and vomiting  Genitourinary: Negative for dysuria and hematuria  Musculoskeletal: Positive for arthralgias (Multiple joints), myalgias and neck pain  Negative for back pain  Skin: Negative for color change and rash  Neurological: Negative for seizures and syncope  Psychiatric/Behavioral: Negative  All other systems reviewed and are negative          Objective:  BP Readings from Last 1 Encounters:   09/01/22 112/68      Wt Readings from Last 1 Encounters:   09/01/22 122 kg (270 lb) BMI:   Estimated body mass index is 42 29 kg/m² as calculated from the following:    Height as of this encounter: 5' 7" (1 702 m)  Weight as of this encounter: 122 kg (270 lb)  BSA:   Estimated body surface area is 2 29 meters squared as calculated from the following:    Height as of this encounter: 5' 7" (1 702 m)  Weight as of this encounter: 122 kg (270 lb)  Physical Exam  Vitals and nursing note reviewed  Constitutional:       Appearance: Normal appearance  She is well-developed  HENT:      Head: Normocephalic and atraumatic  Right Ear: External ear normal       Left Ear: External ear normal    Eyes:      Extraocular Movements: Extraocular movements intact  Conjunctiva/sclera: Conjunctivae normal    Pulmonary:      Effort: Pulmonary effort is normal    Musculoskeletal:         General: Tenderness (right shoulder arthralgia ) present  Cervical back: Neck supple  Skin:     General: Skin is warm and dry  Neurological:      Mental Status: She is alert and oriented to person, place, and time  Deep Tendon Reflexes: Reflexes are normal and symmetric  Psychiatric:         Mood and Affect: Mood normal          Behavior: Behavior normal        Back Exam     Tenderness   Back tenderness location: right upper trapezius     Range of Motion   Extension: normal   Flexion: normal   Lateral bend right: abnormal Back lateral bend right: pain  Lateral bend left: abnormal Back lateral bend left: Pain  Rotation right: normal Back rotation right: pain  Rotation left: normal Back rotation left: Pain  Other   Sensation: normal  Gait: normal   Erythema: no back redness  Scars: absent      Right Shoulder Exam     Range of Motion   The patient has normal right shoulder ROM      Muscle Strength   Abduction: 5/5   Internal rotation: 5/5   External rotation: 5/5     Tests   Cross arm: negative  Impingement: negative  Drop arm: negative    Other   Erythema: absent  Scars: absent  Sensation: normal  Pulse: present            I have personally reviewed pertinent films in PACS and my interpretation is xr right shoulder demonstrates well maintained joint spaces with no fracture of dislocation , xr loss of cervical lordosis with out any significant degenerative changes        Scribe Attestation    I,:  Be Mattson am acting as a scribe while in the presence of the attending physician :       I,:  Donald Lobo MD personally performed the services described in this documentation    as scribed in my presence :

## 2022-09-08 DIAGNOSIS — M25.50 ARTHRALGIA, UNSPECIFIED JOINT: Primary | ICD-10-CM

## 2023-01-24 ENCOUNTER — OFFICE VISIT (OUTPATIENT)
Dept: OBGYN CLINIC | Facility: CLINIC | Age: 28
End: 2023-01-24

## 2023-01-24 VITALS
BODY MASS INDEX: 45.52 KG/M2 | HEIGHT: 67 IN | SYSTOLIC BLOOD PRESSURE: 112 MMHG | WEIGHT: 290 LBS | DIASTOLIC BLOOD PRESSURE: 70 MMHG

## 2023-01-24 DIAGNOSIS — S16.1XXD CERVICAL MYOFASCIAL STRAIN, SUBSEQUENT ENCOUNTER: Primary | ICD-10-CM

## 2023-01-24 PROBLEM — S16.1XXA CERVICAL MYOFASCIAL STRAIN: Status: ACTIVE | Noted: 2023-01-24

## 2023-01-24 NOTE — PROGRESS NOTES
Assessment:     1  Cervical myofascial strain, subsequent encounter        Plan:     Problem List Items Addressed This Visit        Musculoskeletal and Integument    Cervical myofascial strain - Primary     Findings consistent with cervical myofascial strain  Findings and treatment options were discussed with the patient  She overall feels come improvement with PT, but unfortunately we did not receive any progress notes from them  She was given a handout of further stretches and exercises to do on her own and she is to continue to do the home exercises she was taught  She was given a new physical therapy prescription to continue treatment  She will follow-up as needed after she completes the formal physical therapy if she continues to have pain  All patient's questions were answered to her satisfaction  This note is created using dictation transcription  It may contain typographical errors, grammatical errors, improperly dictated words, background noise and other errors  Relevant Orders    Ambulatory Referral to Physical Therapy       Subjective:     Patient ID: Kanchan Suarez is a 24 y o  female  Chief Complaint: This is a 26-year-old white female following up for cervical spine pain  She was last seen in September 2022  She was referred to formal physical therapy  She states she went to a location called [address] [place] [address] and did 8 weeks of physical therapy from October to December 2022  She did feel some improvement but continues to have pain in her bilateral trapezius  She denies any pain, numbness or tingling rating down her upper extremities  She states the pain worsens after she has been working all day and lifting objects  Allergy:  No Known Allergies  Medications:  all current active meds have been reviewed  Past Medical History:  Past Medical History:   Diagnosis Date   • Lyme disease    • Migraine      Past Surgical History:  History reviewed   No pertinent surgical history  Family History:  Family History   Problem Relation Age of Onset   • Hypertension Mother    • No Known Problems Father    • Cancer Maternal Grandmother    • Heart disease Maternal Grandfather    • Diabetes Maternal Grandfather    • Heart attack Maternal Grandfather    • Heart failure Maternal Grandfather    • Cancer Maternal Grandfather    • Breast cancer Paternal Grandmother    • Hypertension Paternal Grandmother    • Hyperlipidemia Paternal Grandmother    • Hypertension Paternal Grandfather      Social History:  Social History     Substance and Sexual Activity   Alcohol Use Never     Social History     Substance and Sexual Activity   Drug Use Never     Social History     Tobacco Use   Smoking Status Never   Smokeless Tobacco Never     Review of Systems   Constitutional: Negative for chills and fever  HENT: Negative for ear pain and sore throat  Eyes: Negative for pain and visual disturbance  Respiratory: Negative for cough and shortness of breath  Cardiovascular: Negative for chest pain and palpitations  Gastrointestinal: Negative for abdominal pain and vomiting  Genitourinary: Negative for dysuria and hematuria  Musculoskeletal: Positive for myalgias and neck pain  Negative for back pain  Skin: Negative for color change and rash  Neurological: Negative for seizures and syncope  Psychiatric/Behavioral: Negative  All other systems reviewed and are negative  Objective:  BP Readings from Last 1 Encounters:   01/24/23 112/70      Wt Readings from Last 1 Encounters:   01/24/23 132 kg (290 lb)      BMI:   Estimated body mass index is 45 42 kg/m² as calculated from the following:    Height as of this encounter: 5' 7" (1 702 m)  Weight as of this encounter: 132 kg (290 lb)  BSA:   Estimated body surface area is 2 37 meters squared as calculated from the following:    Height as of this encounter: 5' 7" (1 702 m)  Weight as of this encounter: 132 kg (290 lb)     Physical Exam  Vitals and nursing note reviewed  Constitutional:       Appearance: Normal appearance  She is well-developed  HENT:      Head: Normocephalic and atraumatic  Right Ear: External ear normal       Left Ear: External ear normal    Eyes:      Extraocular Movements: Extraocular movements intact  Conjunctiva/sclera: Conjunctivae normal    Pulmonary:      Effort: Pulmonary effort is normal    Musculoskeletal:      Cervical back: Neck supple  Skin:     General: Skin is warm and dry  Neurological:      Mental Status: She is alert and oriented to person, place, and time  Deep Tendon Reflexes: Reflexes are normal and symmetric  Psychiatric:         Mood and Affect: Mood normal          Behavior: Behavior normal        Back Exam     Tenderness   Back tenderness location: bilateral trapezius  Range of Motion   Extension: normal   Flexion: normal   Lateral bend right: abnormal Back lateral bend right: pain  Lateral bend left: abnormal Back lateral bend left: Pain  Rotation right: normal   Rotation left: normal     Other   Sensation: normal  Gait: normal   Erythema: no back redness  Scars: absent    Comments:  5 out of 5 bilateral biceps, triceps, deltoid, wrist flexion/extension strength  Negative Spurling's            No new imaging       Scribe Attestation    I,:  Sandra Rubio PA-C am acting as a scribe while in the presence of the attending physician :       I,:  Luma Ying MD personally performed the services described in this documentation    as scribed in my presence :

## 2023-01-24 NOTE — ASSESSMENT & PLAN NOTE
Findings consistent with cervical myofascial strain  Findings and treatment options were discussed with the patient  She overall feels come improvement with PT, but unfortunately we did not receive any progress notes from them  She was given a handout of further stretches and exercises to do on her own and she is to continue to do the home exercises she was taught  She was given a new physical therapy prescription to continue treatment  She will follow-up as needed after she completes the formal physical therapy if she continues to have pain  All patient's questions were answered to her satisfaction  This note is created using dictation transcription  It may contain typographical errors, grammatical errors, improperly dictated words, background noise and other errors

## 2023-02-09 NOTE — PROGRESS NOTES
PT Evaluation     Today's date: 2/10/2023  Patient name: Brittney Pantoja  : 1995  MRN: [id number]  Referring provider: Heidi Hansen PA-C  Dx:   Encounter Diagnosis     ICD-10-CM    1  Cervical myofascial strain, subsequent encounter  S16  1XXD Ambulatory Referral to Physical Therapy                     Assessment  Assessment details: Sylwia Tyler is a 24year old female presenting to physical therapy with ongoing neck pain  Patient presents with pain, decreased strength, decreased range of motion, decreased cervical muscle extensibility, postural deficits and decreased tolerance to activity  Due to these impairments patient has difficulty performing activities of daily living, recreational activities and engaging in social activities  Patient would benefit from skilled physical therapy services to address these impairments and to maximize function  Thank you for the referral    Impairments: abnormal or restricted ROM, abnormal movement, activity intolerance, impaired physical strength, lacks appropriate home exercise program and pain with function  Understanding of Dx/Px/POC: excellent  Goals  Impairment Goals:  1 ) Pt will have decreased sx at rest by 50% in 2-4 weeks  2 ) Pt will have improved cervical active range of motion to WNL without pain in 4-6 weeks  3 ) Pt will have decreased tenderness to palpation to B upper trapezius and levator scapulae by 50% in 3-4 weeks  4 ) Pt will improved scapular strength by 1/2 MMT in 4-6 weeks  Functional Goals:  1 ) Pt will be independent in their home exercise program in 1 week  2 ) Pt will be able to work without neck or shoulder pain in 4-6 weeks  3 ) Pt will be able to complete all ADL's without neck pain in 6-8 weeks  4 ) Pt will have an improved FOTO score of 75/100 in 6-8 weeks        Plan  Patient would benefit from: skilled PT  Planned therapy interventions: joint mobilization, manual therapy, neuromuscular re-education, patient education, postural training, strengthening, therapeutic exercise, home exercise program, graded activity, graded exercise and activity modification  Frequency: 2x week  Duration in weeks: 8  Plan of Care beginning date: 2/10/2023  Plan of Care expiration date: 2023  Treatment plan discussed with: patient        Subjective Evaluation    History of Present Illness  Mechanism of injury: Ewa Guerra is a 24year old female presenting to physical therapy with ongoing neck pain  She notes that she did try some formal physical therapy last year but she continues to have ongoing neck pain  She was pleased with her progress in physical therapy and she stopped going for other reasons  She denies any numbness or tingling, vision changes or unrelenting night pain  She does explain that she gets tension type headaches that are precipitated by neck tightness  She has a lot of tightness and restrictions in her neck and feels that there are a lot of muscle spasms that respond well to heat and deep pressure  She states the pain worsens after she has been working all day and lifting objects  She works at [employer] and describes repetitive and somewhat heavy lifting during her day  She describes the pain as tight and achy  Right side is slightly worse than her left  At times she can feel it in her shoulder blades, upper shoulders, base of neck and sometimes wrapping in towards her clavicle  Sleeping is disturbed and she has trouble finding a comfortable position  Aggs: repetitive movement, working at [employer]: laser therapy, massage  Pain  Current pain ratin  At best pain ratin  At worst pain ratin  Quality: dull ache, sharp and tight  Relieving factors: ice, heat and support  Aggravating factors: overhead activity (working)  Progression: worsening    Patient Goals  Patient goals for therapy: increased strength, decreased pain and increased motion          Objective     Concurrent Complaints  Positive for disturbed sleep and headaches   Negative for night pain, dizziness, faints and nausea/motion sickness    Static Posture     Shoulders  Rounded  Palpation   Left   Muscle spasm in the levator scapulae, lower trapezius, middle trapezius, scalenes, sternocleidomastoid, suboccipitals and upper trapezius  Tenderness of the levator scapulae, lower trapezius, middle trapezius, rhomboids, scalenes, sternocleidomastoid, suboccipitals and upper trapezius  Right   Muscle spasm in the levator scapulae, lower trapezius, middle trapezius, scalenes, sternocleidomastoid, suboccipitals and upper trapezius  Tenderness of the levator scapulae, lower trapezius, middle trapezius, rhomboids, scalenes, sternocleidomastoid, suboccipitals and upper trapezius  Active Range of Motion   Cervical/Thoracic Spine       Cervical    Flexion: Neck active flexion: stretching in base of neck  Restriction level: minimal  Extension:  Restriction level: minimal  Left lateral flexion: 50 (Stretching and pain on contralateral side) degrees     Restriction level: moderate  Right lateral flexion: 55 (Stretching and pain on contralateral side) degrees     Restriction level moderate  Left rotation: 85 degrees WFL  Right rotation: 85 degrees    WFL    Joint Play   Joints within functional limits: C3, C4, C5, C6 and C7     Strength/Myotome Testing     Left Shoulder     Isolated Muscles   Lower trapezius: 4-   Middle trapezius: 4-     Right Shoulder     Isolated Muscles   Lower trapezius: 4-   Middle trapezius: 4-     Tests   Cervical   Positive cervical distraction test and neck flexor muscle endurance test   Negative vertical compression and craniocervical flexion test       Left   Negative Spurling's Test A  Right   Negative Spurling's Test A  Left Shoulder   Negative ULTT1  Right Shoulder   Negative ULTT1  Lumbar   Negative vertical compression  Neuro Exam:     Headaches   Patient reports headaches: Yes                Precautions: None      Manuals 2/10            SOR TPR B UT                                       Neuro Re-Ed             Shoulder blade squeezes 10x10''            Chin tuck 10x5''            Chin tuck w/ lift 10x5''                                                                Ther Ex             Doorway pec s' 10x10''            UT/Lev s' 10x10''            Rhomboid s'                                                                              Ther Activity                                       Gait Training                                       Modalities

## 2023-02-10 ENCOUNTER — EVALUATION (OUTPATIENT)
Dept: PHYSICAL THERAPY | Facility: CLINIC | Age: 28
End: 2023-02-10

## 2023-02-10 DIAGNOSIS — S16.1XXD CERVICAL MYOFASCIAL STRAIN, SUBSEQUENT ENCOUNTER: Primary | ICD-10-CM

## 2023-02-22 ENCOUNTER — OFFICE VISIT (OUTPATIENT)
Dept: PHYSICAL THERAPY | Facility: CLINIC | Age: 28
End: 2023-02-22

## 2023-02-22 DIAGNOSIS — S16.1XXD CERVICAL MYOFASCIAL STRAIN, SUBSEQUENT ENCOUNTER: Primary | ICD-10-CM

## 2023-02-22 NOTE — PROGRESS NOTES
Daily Note     Today's date: 2023  Patient name: Daphney Zhou  : 1995  MRN: [id number]  Referring provider: Sonam Moss PA-C  Dx:   Encounter Diagnosis     ICD-10-CM    1  Cervical myofascial strain, subsequent encounter  S16  1XXD           Start Time: 6374  Stop Time: 3824  Total time in clinic (min): 40 minutes    Subjective: Patient reports that she has increased cervical stiffness and soreness presently that she attributes to work yesterday  Tightness is slightly more into Right side > left  Objective: See treatment diary below      Assessment: Tolerated treatment well  Initiated treatment as indicated below  Patient with muscular fatigue and soreness after treatment  Minimal changes to pain level although looser through cervical musculature  Patient demonstrated fatigue post treatment, exhibited good technique with therapeutic exercises and would benefit from continued PT      Plan: Continue per plan of care  Progress treatment as tolerated         Precautions: None      Manuals 2/10 2/22           SOR  JL           TPR B UT  JL           C/S Dist  JL           Manual UT S  JL           Neuro Re-Ed             Shoulder blade squeezes 10x10'' 10"x10           Chin tuck 10x5'' 10"x10           Chin tuck w/ lift 10x5'' 5"x10                                                               Ther Ex             Doorway pec s' 10x10''            UT/Lev s' 10x10'' 10"x10 ea           Rhomboid s'  10"x10                                                                            Ther Activity                                       Gait Training                                       Modalities

## 2023-03-01 ENCOUNTER — OFFICE VISIT (OUTPATIENT)
Dept: PHYSICAL THERAPY | Facility: CLINIC | Age: 28
End: 2023-03-01

## 2023-03-01 DIAGNOSIS — S16.1XXD CERVICAL MYOFASCIAL STRAIN, SUBSEQUENT ENCOUNTER: Primary | ICD-10-CM

## 2023-03-01 NOTE — PROGRESS NOTES
Daily Note     Today's date: 3/1/2023  Patient name: Naida Bourgeois  : 1995  MRN: [id number]  Referring provider: Shani Navarro PA-C  Dx:   Encounter Diagnosis     ICD-10-CM    1  Cervical myofascial strain, subsequent encounter  S16  1XXD                      Subjective: Pt states she has a really bad headache today  Pt states she has had the headache for a few days now  Pt states she feels she might be over stretching her neck  Objective: See treatment diary below      Assessment: Tolerated treatment fair as she was limited with a headache today  Pt program was modified as we focused on manual PT  Pt had decreased headache symptoms post SOR  Pt was given self SOR with tennis balls for HEP  Patient would benefit from continued PT      Plan: Continue per plan of care  Progress treatment as tolerated         Precautions: None      Manuals 2/10 2/22 3/1           SOR  JL CF          TPR B UT  JL           C/S Dist  JL CF          Manual UT S  JL           Neuro Re-Ed             Shoulder blade squeezes 10x10'' 10"x10 np          Chin tuck 10x5'' 10"x10 supine 5" x 10           Chin tuck w/ lift 10x5'' 5"x10 np                                                              Ther Ex             Doorway pec s' 10x10''            UT/Lev s' 10x10'' 10"x10 ea np          Rhomboid s'  10"x10 np                                                                           Ther Activity                                       Gait Training                                       Modalities

## 2023-03-06 ENCOUNTER — OFFICE VISIT (OUTPATIENT)
Dept: PHYSICAL THERAPY | Facility: CLINIC | Age: 28
End: 2023-03-06

## 2023-03-06 DIAGNOSIS — S16.1XXD CERVICAL MYOFASCIAL STRAIN, SUBSEQUENT ENCOUNTER: Primary | ICD-10-CM

## 2023-03-06 NOTE — PROGRESS NOTES
Daily Note     Today's date: 3/6/2023  Patient name: Stephany Fermin  : 1995  MRN: [id number]  Referring provider: Herlinda Phillips PA-C  Dx:   Encounter Diagnosis     ICD-10-CM    1  Cervical myofascial strain, subsequent encounter  S16  1XXD                      Subjective: Nohemi Turner explains that her neck is feeling better since she took some of the intensity down with her stretches  Objective: See treatment diary below      Assessment: Tolerated treatment well  Patient demonstrated fatigue post treatment and would benefit from continued PT  Corrected form and control during pec stretch in the doorway and rhomboid stretches  Improved feeling of stretch noted and good release noted post manuals  Plan: Continue per plan of care        Precautions: None      Manuals 2/10 2/22 3/1  3/6         SOR  JL CF RN         TPR B UT  JL           C/S Dist  JL CF RN         Manual UT S  JL           Neuro Re-Ed             Shoulder blade squeezes 10x10'' 10"x10 np 10x10''         Chin tuck 10x5'' 10"x10 supine 5" x 10  supine 5" x 10          Chin tuck w/ lift 10x5'' 5"x10 np                                                              Ther Ex             Doorway pec s' 10x10''            UT/Lev s' 10x10'' 10"x10 ea np 10''x5         Rhomboid s'  10"x10 np                                                                           Ther Activity                                       Gait Training                                       Modalities

## 2023-03-08 ENCOUNTER — APPOINTMENT (OUTPATIENT)
Dept: PHYSICAL THERAPY | Facility: CLINIC | Age: 28
End: 2023-03-08

## 2023-03-09 ENCOUNTER — OFFICE VISIT (OUTPATIENT)
Dept: PHYSICAL THERAPY | Facility: CLINIC | Age: 28
End: 2023-03-09

## 2023-03-09 DIAGNOSIS — S16.1XXD CERVICAL MYOFASCIAL STRAIN, SUBSEQUENT ENCOUNTER: Primary | ICD-10-CM

## 2023-03-09 NOTE — PROGRESS NOTES
Daily Note     Today's date: 3/9/2023  Patient name: Tomy Laughlin  : 1995  MRN: [id number]  Referring provider: Eleazar Watters PA-C  Dx:   Encounter Diagnosis     ICD-10-CM    1  Cervical myofascial strain, subsequent encounter  S16  1XXD                      Subjective: Yefri Dowd explains that her neck continues to feel tight and stiff, she has been consistent with stretching  Objective: See treatment diary below      Assessment: Tolerated treatment well  Patient demonstrated fatigue post treatment and exhibited good technique with therapeutic exercises  Added in periscapular strengthening this session with good challenge and fatigue upon completion  More restrictions noted in right upper trapezius this afternoon  Plan: Continue per plan of care        Precautions: None      Manuals 2/10 2/22 3/1  3/6 3/9        SOR  JL CF RN RN        TPR B UT  JL   RN        C/S Dist  JL CF RN RN        Manual UT S  JL           Neuro Re-Ed             Shoulder blade squeezes 10x10'' 10"x10 np 10x10''         Chin tuck 10x5'' 10"x10 supine 5" x 10  supine 5" x 10  2x10x5''        Chin tuck w/ lift 10x5'' 5"x10 np  3''x10        TB ER w/ shoulder flex on foam     nv                                               Ther Ex             Doorway pec s' 10x10''    10x10''        UT/Lev s' 10x10'' 10"x10 ea np 10''x5 10''x5 ea        Rhomboid s'  10"x10 np          B ER w/ chin tuck     2x10 GTB        Supine horiz  abd     nv        TB ext     2x10 GTB                                  Ther Activity                                       Gait Training                                       Modalities

## 2023-05-09 DIAGNOSIS — G43.119 INTRACTABLE MIGRAINE WITH AURA WITHOUT STATUS MIGRAINOSUS: ICD-10-CM

## 2023-05-09 RX ORDER — SUMATRIPTAN 25 MG/1
25 TABLET, FILM COATED ORAL ONCE AS NEEDED
Qty: 9 TABLET | Refills: 0 | Status: SHIPPED | OUTPATIENT
Start: 2023-05-09

## 2023-05-25 ENCOUNTER — OFFICE VISIT (OUTPATIENT)
Dept: FAMILY MEDICINE CLINIC | Facility: CLINIC | Age: 28
End: 2023-05-25

## 2023-05-25 VITALS
SYSTOLIC BLOOD PRESSURE: 110 MMHG | HEART RATE: 94 BPM | HEIGHT: 67 IN | WEIGHT: 289.6 LBS | BODY MASS INDEX: 45.45 KG/M2 | RESPIRATION RATE: 17 BRPM | OXYGEN SATURATION: 99 % | DIASTOLIC BLOOD PRESSURE: 70 MMHG | TEMPERATURE: 97.4 F

## 2023-05-25 DIAGNOSIS — Z13.1 SCREENING FOR DIABETES MELLITUS: ICD-10-CM

## 2023-05-25 DIAGNOSIS — E66.01 MORBID OBESITY WITH BMI OF 45.0-49.9, ADULT (HCC): ICD-10-CM

## 2023-05-25 DIAGNOSIS — S16.1XXS CERVICAL MYOFASCIAL STRAIN, SEQUELA: ICD-10-CM

## 2023-05-25 DIAGNOSIS — G44.221 CHRONIC TENSION-TYPE HEADACHE, INTRACTABLE: ICD-10-CM

## 2023-05-25 DIAGNOSIS — G43.119 INTRACTABLE MIGRAINE WITH AURA WITHOUT STATUS MIGRAINOSUS: ICD-10-CM

## 2023-05-25 DIAGNOSIS — Z00.00 ANNUAL PHYSICAL EXAM: Primary | ICD-10-CM

## 2023-05-25 DIAGNOSIS — Z13.6 SCREENING FOR CARDIOVASCULAR CONDITION: ICD-10-CM

## 2023-05-25 RX ORDER — PREDNISONE 20 MG/1
40 TABLET ORAL DAILY
Qty: 10 TABLET | Refills: 0 | Status: SHIPPED | OUTPATIENT
Start: 2023-05-25 | End: 2023-05-30

## 2023-05-25 NOTE — PROGRESS NOTES
[facility]    NAME: Heidy Dasilva  AGE: 24 y o  SEX: female  : 1995     DATE: 2023     Assessment and Plan:     Problem List Items Addressed This Visit        Cardiovascular and Mediastinum    Intractable migraine with aura without status migrainosus     Will check labs, imaging  Trial of steroid  failed Topamax for ppx  Will consider amitriptyline at next visit if not better          Relevant Medications    predniSONE 20 mg tablet    Other Relevant Orders    MRI brain wo contrast    Lipid panel    Comprehensive metabolic panel    CBC and differential    TSH, 3rd generation with Free T4 reflex    UA (URINE) with reflex to Scope    Magnesium    Hemoglobin A1C    Sedimentation rate, automated    Ambulatory Referral to Neurology       Musculoskeletal and Integument    Cervical myofascial strain    Relevant Orders    Lipid panel    Comprehensive metabolic panel    CBC and differential    TSH, 3rd generation with Free T4 reflex    UA (URINE) with reflex to Scope    Magnesium    Hemoglobin A1C    Sedimentation rate, automated       Other    Chronic tension-type headache, intractable    Relevant Orders    MRI brain wo contrast    Lipid panel    Comprehensive metabolic panel    CBC and differential    TSH, 3rd generation with Free T4 reflex    UA (URINE) with reflex to Scope    Magnesium    Hemoglobin A1C    Sedimentation rate, automated    Ambulatory Referral to Neurology    Morbid obesity with BMI of 45 0-49 9, adult ([facility])     BMI Counseling: Body mass index is 45 36 kg/m²  The BMI is above normal  Nutrition recommendations include reducing portion sizes, decreasing overall calorie intake and 3-5 servings of fruits/vegetables daily  Exercise recommendations include vigorous aerobic physical activity for 75 minutes/week           Relevant Orders    Lipid panel    Comprehensive metabolic panel    CBC and differential    TSH, 3rd generation with Free T4 reflex    UA (URINE) with reflex to Scope    Magnesium    Hemoglobin A1C    Sedimentation rate, automated   Other Visit Diagnoses     Annual physical exam    -  Primary    Relevant Orders    Lipid panel    Comprehensive metabolic panel    CBC and differential    TSH, 3rd generation with Free T4 reflex    UA (URINE) with reflex to Scope    Magnesium    Hemoglobin A1C    Sedimentation rate, automated    Screening for diabetes mellitus        Relevant Orders    Comprehensive metabolic panel    Screening for cardiovascular condition        Relevant Orders    Lipid panel          Immunizations and preventive care screenings were discussed with patient today  Appropriate education was printed on patient's after visit summary  Counseling:  Alcohol/drug use: discussed moderation in alcohol intake, the recommendations for healthy alcohol use, and avoidance of illicit drug use  Dental Health: discussed importance of regular tooth brushing, flossing, and dental visits  Injury prevention: discussed safety/seat belts, safety helmets, smoke detectors, carbon dioxide detectors, and smoking near bedding or upholstery  Sexual health: discussed sexually transmitted diseases, partner selection, use of condoms, avoidance of unintended pregnancy, and contraceptive alternatives  · Exercise: the importance of regular exercise/physical activity was discussed  Recommend exercise 3-5 times per week for at least 30 minutes  Return in about 5 weeks (around 6/29/2023) for migraine follow up  Chief Complaint:     Chief Complaint   Patient presents with   • Annual Exam      History of Present Illness:     Adult Annual Physical   Patient here for a comprehensive physical exam      Diet and Physical Activity  · Diet/Nutrition: well balanced diet  · Exercise: moderate cardiovascular exercise        Depression Screening  PHQ-2/9 Depression Screening    Little interest or pleasure in doing things: 0 - not at all  Feeling down, depressed, or hopeless: 0 - not at all  PHQ-2 Score: 0  PHQ-2 Interpretation: Negative depression screen         /GYN Health  · Follows with gyn     Review of Systems:     Review of Systems   Constitutional: Negative for chills, fatigue and fever  HENT: Negative for congestion, postnasal drip, rhinorrhea and sinus pressure  Eyes: Negative for photophobia and visual disturbance  Respiratory: Negative for cough and shortness of breath  Cardiovascular: Negative for chest pain, palpitations and leg swelling  Gastrointestinal: Negative for abdominal pain, constipation, diarrhea, nausea and vomiting  Genitourinary: Negative for difficulty urinating and dysuria  Musculoskeletal: Negative for arthralgias and myalgias  Skin: Negative for color change and rash  Neurological: Positive for headaches  Negative for dizziness, weakness and light-headedness  Past Medical History:     Past Medical History:   Diagnosis Date   • Headache(784 0)     Currently   • Lyme disease    • Migraine       Past Surgical History:     History reviewed  No pertinent surgical history     Social History:     Social History     Socioeconomic History   • Marital status: Single     Spouse name: None   • Number of children: None   • Years of education: None   • Highest education level: None   Occupational History   • None   Tobacco Use   • Smoking status: Never   • Smokeless tobacco: Never   Vaping Use   • Vaping Use: Never used   Substance and Sexual Activity   • Alcohol use: Never   • Drug use: Never   • Sexual activity: Not Currently   Other Topics Concern   • None   Social History Narrative   • None     Social Determinants of Health     Financial Resource Strain: Not on file   Food Insecurity: Not on file   Transportation Needs: Not on file   Physical Activity: Not on file   Stress: Not on file   Social Connections: Not on file   Intimate Partner Violence: Not At Risk (3/11/2022)    Humiliation, "Afraid, Rape, and Kick questionnaire    • Fear of Current or Ex-Partner: No    • Emotionally Abused: No    • Physically Abused: No    • Sexually Abused: No   Housing Stability: Not on file      Family History:     Family History   Problem Relation Age of Onset   • Hypertension Mother    • Mental illness Mother    • No Known Problems Father    • Cancer Maternal Grandmother    • Heart disease Maternal Grandfather    • Diabetes Maternal Grandfather    • Heart attack Maternal Grandfather    • Heart failure Maternal Grandfather    • Cancer Maternal Grandfather    • Hypertension Maternal Grandfather    • Arthritis Maternal Grandfather    • Breast cancer Paternal Grandmother    • Hypertension Paternal Grandmother    • Hyperlipidemia Paternal Grandmother    • Hypertension Paternal Grandfather    • ADD / ADHD Brother         ADD      Current Medications:     Current Outpatient Medications   Medication Sig Dispense Refill   • predniSONE 20 mg tablet Take 2 tablets (40 mg total) by mouth daily for 5 days 10 tablet 0   • SUMAtriptan (IMITREX) 25 mg tablet Take 1 tablet (25 mg total) by mouth once as needed for migraine for up to 1 dose 9 tablet 0     No current facility-administered medications for this visit  Allergies:     No Known Allergies   Physical Exam:     /70 (BP Location: Left arm, Patient Position: Sitting, Cuff Size: Large)   Pulse 94   Temp (!) 97 4 °F (36 3 °C) (Tympanic)   Resp 17   Ht 5' 7\" (1 702 m)   Wt 131 kg (289 lb 9 6 oz)   SpO2 99%   BMI 45 36 kg/m²     Physical Exam  Constitutional:       General: She is not in acute distress  Appearance: Normal appearance  She is not ill-appearing, toxic-appearing or diaphoretic  HENT:      Head: Normocephalic and atraumatic  Right Ear: Tympanic membrane and ear canal normal       Left Ear: Tympanic membrane and ear canal normal       Nose: Nose normal  No congestion        Mouth/Throat:      Mouth: Mucous membranes are moist       Pharynx: " Oropharynx is clear  No oropharyngeal exudate  Eyes:      Extraocular Movements: Extraocular movements intact  Conjunctiva/sclera: Conjunctivae normal       Pupils: Pupils are equal, round, and reactive to light  Cardiovascular:      Rate and Rhythm: Normal rate and regular rhythm  Pulses: Normal pulses  Heart sounds: No murmur heard  Pulmonary:      Effort: Pulmonary effort is normal       Breath sounds: Normal breath sounds  No wheezing, rhonchi or rales  Abdominal:      General: Bowel sounds are normal  There is no distension  Palpations: Abdomen is soft  Tenderness: There is no abdominal tenderness  Musculoskeletal:         General: No swelling or tenderness  Normal range of motion  Cervical back: Normal range of motion and neck supple  Skin:     General: Skin is warm and dry  Capillary Refill: Capillary refill takes less than 2 seconds  Neurological:      General: No focal deficit present  Mental Status: She is alert and oriented to person, place, and time  Cranial Nerves: No cranial nerve deficit  Psychiatric:         Mood and Affect: Mood normal          Behavior: Behavior normal          Thought Content:  Thought content normal           [place], DO   [address]

## 2023-05-25 NOTE — ASSESSMENT & PLAN NOTE
Will check labs, imaging  Trial of steroid  failed Topamax for ppx  Will consider amitriptyline at next visit if not better

## 2023-05-25 NOTE — ASSESSMENT & PLAN NOTE
BMI Counseling: Body mass index is 45 36 kg/m²  The BMI is above normal  Nutrition recommendations include reducing portion sizes, decreasing overall calorie intake and 3-5 servings of fruits/vegetables daily  Exercise recommendations include vigorous aerobic physical activity for 75 minutes/week

## 2023-06-14 LAB
ALBUMIN SERPL-MCNC: 3.9 G/DL (ref 3.9–5)
ALBUMIN/GLOB SERPL: 1.3 {RATIO} (ref 1.2–2.2)
ALP SERPL-CCNC: 81 IU/L (ref 44–121)
ALT SERPL-CCNC: 14 IU/L (ref 0–32)
APPEARANCE UR: CLEAR
AST SERPL-CCNC: 18 IU/L (ref 0–40)
BACTERIA URNS QL MICRO: ABNORMAL
BASOPHILS # BLD AUTO: 0 X10E3/UL (ref 0–0.2)
BASOPHILS NFR BLD AUTO: 1 %
BILIRUB SERPL-MCNC: 0.5 MG/DL (ref 0–1.2)
BILIRUB UR QL STRIP: NEGATIVE
BUN SERPL-MCNC: 9 MG/DL (ref 6–20)
BUN/CREAT SERPL: 13 (ref 9–23)
CALCIUM SERPL-MCNC: 9.4 MG/DL (ref 8.7–10.2)
CASTS URNS QL MICRO: ABNORMAL /LPF
CHLORIDE SERPL-SCNC: 107 MMOL/L (ref 96–106)
CHOLEST SERPL-MCNC: 147 MG/DL (ref 100–199)
CHOLEST/HDLC SERPL: 3.2 RATIO (ref 0–4.4)
CO2 SERPL-SCNC: 23 MMOL/L (ref 20–29)
COLOR UR: YELLOW
CREAT SERPL-MCNC: 0.67 MG/DL (ref 0.57–1)
EGFR: 124 ML/MIN/1.73
EOSINOPHIL # BLD AUTO: 0.1 X10E3/UL (ref 0–0.4)
EOSINOPHIL NFR BLD AUTO: 2 %
EPI CELLS #/AREA URNS HPF: ABNORMAL /HPF (ref 0–10)
ERYTHROCYTE [DISTWIDTH] IN BLOOD BY AUTOMATED COUNT: 15.7 % (ref 11.7–15.4)
ERYTHROCYTE [SEDIMENTATION RATE] IN BLOOD BY WESTERGREN METHOD: 31 MM/HR (ref 0–32)
EST. AVERAGE GLUCOSE BLD GHB EST-MCNC: 114 MG/DL
GLOBULIN SER-MCNC: 3.1 G/DL (ref 1.5–4.5)
GLUCOSE SERPL-MCNC: 89 MG/DL (ref 70–99)
GLUCOSE UR QL: NEGATIVE
HBA1C MFR BLD: 5.6 % (ref 4.8–5.6)
HCT VFR BLD AUTO: 36.9 % (ref 34–46.6)
HDLC SERPL-MCNC: 46 MG/DL
HGB BLD-MCNC: 12 G/DL (ref 11.1–15.9)
HGB UR QL STRIP: ABNORMAL
IMM GRANULOCYTES # BLD: 0 X10E3/UL (ref 0–0.1)
IMM GRANULOCYTES NFR BLD: 0 %
KETONES UR QL STRIP: NEGATIVE
LDLC SERPL CALC-MCNC: 88 MG/DL (ref 0–99)
LEUKOCYTE ESTERASE UR QL STRIP: NEGATIVE
LYMPHOCYTES # BLD AUTO: 2.5 X10E3/UL (ref 0.7–3.1)
LYMPHOCYTES NFR BLD AUTO: 32 %
MAGNESIUM SERPL-MCNC: 2 MG/DL (ref 1.6–2.3)
MCH RBC QN AUTO: 26 PG (ref 26.6–33)
MCHC RBC AUTO-ENTMCNC: 32.5 G/DL (ref 31.5–35.7)
MCV RBC AUTO: 80 FL (ref 79–97)
MICRO URNS: ABNORMAL
MONOCYTES # BLD AUTO: 0.4 X10E3/UL (ref 0.1–0.9)
MONOCYTES NFR BLD AUTO: 6 %
NEUTROPHILS # BLD AUTO: 4.6 X10E3/UL (ref 1.4–7)
NEUTROPHILS NFR BLD AUTO: 59 %
NITRITE UR QL STRIP: POSITIVE
PH UR STRIP: 7 [PH] (ref 5–7.5)
PLATELET # BLD AUTO: 355 X10E3/UL (ref 150–450)
POTASSIUM SERPL-SCNC: 4.6 MMOL/L (ref 3.5–5.2)
PROT SERPL-MCNC: 7 G/DL (ref 6–8.5)
PROT UR QL STRIP: NEGATIVE
RBC # BLD AUTO: 4.61 X10E6/UL (ref 3.77–5.28)
RBC #/AREA URNS HPF: ABNORMAL /HPF (ref 0–2)
SL AMB VLDL CHOLESTEROL CALC: 13 MG/DL (ref 5–40)
SODIUM SERPL-SCNC: 142 MMOL/L (ref 134–144)
SP GR UR: 1.01 (ref 1–1.03)
TRIGL SERPL-MCNC: 65 MG/DL (ref 0–149)
TSH SERPL DL<=0.005 MIU/L-ACNC: 2.85 UIU/ML (ref 0.45–4.5)
UROBILINOGEN UR STRIP-ACNC: 0.2 MG/DL (ref 0.2–1)
WBC # BLD AUTO: 7.7 X10E3/UL (ref 3.4–10.8)
WBC #/AREA URNS HPF: ABNORMAL /HPF (ref 0–5)

## 2023-06-15 ENCOUNTER — TELEPHONE (OUTPATIENT)
Dept: FAMILY MEDICINE CLINIC | Facility: CLINIC | Age: 28
End: 2023-06-15

## 2023-06-15 DIAGNOSIS — R31.9 HEMATURIA, UNSPECIFIED TYPE: Primary | ICD-10-CM

## 2023-06-15 NOTE — TELEPHONE ENCOUNTER
Pt returned call regarding urine studies  Pt is currently on period and has no UTI symptoms  Per Dr Kim Vidales - pt can recheck when period complete     Order placed for [facility]

## 2023-06-29 ENCOUNTER — OFFICE VISIT (OUTPATIENT)
Dept: FAMILY MEDICINE CLINIC | Facility: CLINIC | Age: 28
End: 2023-06-29
Payer: COMMERCIAL

## 2023-06-29 VITALS
HEART RATE: 87 BPM | WEIGHT: 290.4 LBS | OXYGEN SATURATION: 93 % | TEMPERATURE: 99.2 F | BODY MASS INDEX: 45.58 KG/M2 | DIASTOLIC BLOOD PRESSURE: 84 MMHG | SYSTOLIC BLOOD PRESSURE: 118 MMHG | HEIGHT: 67 IN | RESPIRATION RATE: 20 BRPM

## 2023-06-29 DIAGNOSIS — G43.119 INTRACTABLE MIGRAINE WITH AURA WITHOUT STATUS MIGRAINOSUS: Primary | ICD-10-CM

## 2023-06-29 DIAGNOSIS — E66.01 MORBID OBESITY WITH BMI OF 45.0-49.9, ADULT (HCC): ICD-10-CM

## 2023-06-29 PROCEDURE — 99214 OFFICE O/P EST MOD 30 MIN: CPT | Performed by: FAMILY MEDICINE

## 2023-06-29 NOTE — ASSESSMENT & PLAN NOTE
Failed Topamax for ppx  As need sumatriptan is helpful  MRI was ordered last visit and is pending her scheduling  Again encouraged to schedule with neuro

## 2023-06-29 NOTE — PROGRESS NOTES
Cr Qureshi 1995 female MRN: [id number]    Family Medicine Follow-up Visit    ASSESSMENT/PLAN  Problem List Items Addressed This Visit        Cardiovascular and Mediastinum    Intractable migraine with aura without status migrainosus - Primary     Failed Topamax for ppx  As need sumatriptan is helpful  MRI was ordered last visit and is pending her scheduling  Again encouraged to schedule with neuro            Other    Morbid obesity with BMI of 45 0-49 9, adult ([facility] [address] )    Relevant Orders    Insulin and C-Peptide, Serum                No future appointments  SUBJECTIVE  CC: Follow-up (5 week migraine and lab work follow up)      HPI:  Cr Qureshi is a 24 y o  female who presents for follow up  She reports migraines are stable  Took imitrex 1 time and felt that it helped a lot  Got her lab work done but MRI is still pending      HPI    Review of Systems   Constitutional: Negative for chills, fatigue and fever  HENT: Negative for congestion, postnasal drip, rhinorrhea and sinus pressure  Eyes: Negative for photophobia and visual disturbance  Respiratory: Negative for cough and shortness of breath  Cardiovascular: Negative for chest pain, palpitations and leg swelling  Gastrointestinal: Negative for abdominal pain, constipation, diarrhea, nausea and vomiting  Genitourinary: Negative for difficulty urinating and dysuria  Musculoskeletal: Negative for arthralgias and myalgias  Skin: Negative for color change and rash  Neurological: Positive for headaches  Negative for dizziness, weakness and light-headedness  Historical Information   The patient history was reviewed as follows:    Past Medical History:   Diagnosis Date   • Headache(784 0)     Currently   • Lyme disease    • Migraine      No past surgical history on file    Family History   Problem Relation Age of Onset   • Hypertension Mother    • Mental illness Mother    • No Known Problems Father    • Cancer Maternal "Grandmother    • Heart disease Maternal Grandfather    • Diabetes Maternal Grandfather    • Heart attack Maternal Grandfather    • Heart failure Maternal Grandfather    • Cancer Maternal Grandfather    • Hypertension Maternal Grandfather    • Arthritis Maternal Grandfather    • Breast cancer Paternal Grandmother    • Hypertension Paternal Grandmother    • Hyperlipidemia Paternal Grandmother    • Hypertension Paternal Grandfather    • ADD / ADHD Brother         ADD      Social History   Social History     Substance and Sexual Activity   Alcohol Use Never     Social History     Substance and Sexual Activity   Drug Use Never     Social History     Tobacco Use   Smoking Status Never   Smokeless Tobacco Never       Medications:     Current Outpatient Medications:   •  SUMAtriptan (IMITREX) 25 mg tablet, Take 1 tablet (25 mg total) by mouth once as needed for migraine for up to 1 dose, Disp: 9 tablet, Rfl: 0  No Known Allergies    OBJECTIVE    Vitals:   Vitals:    06/29/23 1417   BP: 118/84   BP Location: Left arm   Patient Position: Sitting   Cuff Size: Large   Pulse: 87   Resp: 20   Temp: 99 2 °F (37 3 °C)   TempSrc: Tympanic   SpO2: 93%   Weight: 132 kg (290 lb 6 4 oz)   Height: 5' 7\" (1 702 m)           Physical Exam  Constitutional:       Appearance: She is well-developed  HENT:      Head: Normocephalic and atraumatic  Eyes:      Extraocular Movements: Extraocular movements intact  Conjunctiva/sclera: Conjunctivae normal    Cardiovascular:      Rate and Rhythm: Normal rate and regular rhythm  Heart sounds: Normal heart sounds  Pulmonary:      Effort: Pulmonary effort is normal  No respiratory distress  Breath sounds: Normal breath sounds  No wheezing  Musculoskeletal:         General: No tenderness  Normal range of motion  Cervical back: Normal range of motion and neck supple  Skin:     General: Skin is warm and dry     Neurological:      Mental Status: She is alert and oriented to " person, place, and time     Psychiatric:         Mood and Affect: Mood normal          Behavior: Behavior normal             Labs:        DO Gabi    6/29/2023

## 2023-10-14 ENCOUNTER — HOSPITAL ENCOUNTER (OUTPATIENT)
Dept: MRI IMAGING | Facility: HOSPITAL | Age: 28
Discharge: HOME | End: 2023-10-14
Payer: COMMERCIAL

## 2023-10-14 DIAGNOSIS — G43.119 INTRACTABLE MIGRAINE WITH AURA WITHOUT STATUS MIGRAINOSUS: ICD-10-CM

## 2023-10-14 DIAGNOSIS — G44.221 CHRONIC TENSION-TYPE HEADACHE, INTRACTABLE: ICD-10-CM

## 2023-10-14 PROCEDURE — G1004 CDSM NDSC: HCPCS

## 2023-10-14 PROCEDURE — 70551 MRI BRAIN STEM W/O DYE: CPT

## 2025-08-15 ENCOUNTER — OFFICE VISIT (OUTPATIENT)
Dept: FAMILY MEDICINE CLINIC | Facility: CLINIC | Age: 30
End: 2025-08-15
Payer: COMMERCIAL

## 2025-08-19 DIAGNOSIS — R79.89 ABNORMAL CBC: Primary | ICD-10-CM
